# Patient Record
Sex: FEMALE | Race: WHITE | Employment: PART TIME | URBAN - NONMETROPOLITAN AREA
[De-identification: names, ages, dates, MRNs, and addresses within clinical notes are randomized per-mention and may not be internally consistent; named-entity substitution may affect disease eponyms.]

---

## 2017-03-29 ENCOUNTER — OFFICE VISIT (OUTPATIENT)
Dept: CARDIOLOGY | Age: 57
End: 2017-03-29

## 2017-03-29 VITALS
HEIGHT: 60 IN | DIASTOLIC BLOOD PRESSURE: 68 MMHG | BODY MASS INDEX: 20.59 KG/M2 | SYSTOLIC BLOOD PRESSURE: 162 MMHG | WEIGHT: 104.9 LBS | HEART RATE: 78 BPM

## 2017-03-29 DIAGNOSIS — I10 ESSENTIAL HYPERTENSION: Primary | ICD-10-CM

## 2017-03-29 DIAGNOSIS — R52 PAIN: ICD-10-CM

## 2017-03-29 DIAGNOSIS — E78.01 FAMILIAL HYPERCHOLESTEROLEMIA: ICD-10-CM

## 2017-03-29 DIAGNOSIS — I25.10 CORONARY ARTERY DISEASE INVOLVING NATIVE CORONARY ARTERY OF NATIVE HEART WITHOUT ANGINA PECTORIS: ICD-10-CM

## 2017-03-29 PROCEDURE — 3014F SCREEN MAMMO DOC REV: CPT | Performed by: NUCLEAR MEDICINE

## 2017-03-29 PROCEDURE — G8420 CALC BMI NORM PARAMETERS: HCPCS | Performed by: NUCLEAR MEDICINE

## 2017-03-29 PROCEDURE — 3017F COLORECTAL CA SCREEN DOC REV: CPT | Performed by: NUCLEAR MEDICINE

## 2017-03-29 PROCEDURE — G8484 FLU IMMUNIZE NO ADMIN: HCPCS | Performed by: NUCLEAR MEDICINE

## 2017-03-29 PROCEDURE — G8427 DOCREV CUR MEDS BY ELIG CLIN: HCPCS | Performed by: NUCLEAR MEDICINE

## 2017-03-29 PROCEDURE — 99214 OFFICE O/P EST MOD 30 MIN: CPT | Performed by: NUCLEAR MEDICINE

## 2017-03-29 PROCEDURE — 4004F PT TOBACCO SCREEN RCVD TLK: CPT | Performed by: NUCLEAR MEDICINE

## 2017-03-29 PROCEDURE — G8599 NO ASA/ANTIPLAT THER USE RNG: HCPCS | Performed by: NUCLEAR MEDICINE

## 2017-03-29 RX ORDER — AMLODIPINE BESYLATE 5 MG/1
5 TABLET ORAL DAILY
Qty: 30 TABLET | Refills: 6 | Status: SHIPPED | OUTPATIENT
Start: 2017-03-29 | End: 2017-10-27 | Stop reason: ALTCHOICE

## 2017-03-29 RX ORDER — PANTOPRAZOLE SODIUM 40 MG/1
40 TABLET, DELAYED RELEASE ORAL DAILY
Qty: 30 TABLET | Refills: 6 | Status: SHIPPED | OUTPATIENT
Start: 2017-03-29 | End: 2017-10-27 | Stop reason: ALTCHOICE

## 2017-10-27 ENCOUNTER — OFFICE VISIT (OUTPATIENT)
Dept: CARDIOLOGY CLINIC | Age: 57
End: 2017-10-27
Payer: COMMERCIAL

## 2017-10-27 VITALS
DIASTOLIC BLOOD PRESSURE: 68 MMHG | HEIGHT: 59 IN | WEIGHT: 101.9 LBS | SYSTOLIC BLOOD PRESSURE: 102 MMHG | BODY MASS INDEX: 20.54 KG/M2 | HEART RATE: 62 BPM

## 2017-10-27 DIAGNOSIS — I25.10 CORONARY ARTERY DISEASE INVOLVING NATIVE CORONARY ARTERY OF NATIVE HEART WITHOUT ANGINA PECTORIS: Primary | ICD-10-CM

## 2017-10-27 DIAGNOSIS — R07.9 CHEST PAIN, UNSPECIFIED TYPE: ICD-10-CM

## 2017-10-27 PROCEDURE — G8599 NO ASA/ANTIPLAT THER USE RNG: HCPCS | Performed by: NUCLEAR MEDICINE

## 2017-10-27 PROCEDURE — 3017F COLORECTAL CA SCREEN DOC REV: CPT | Performed by: NUCLEAR MEDICINE

## 2017-10-27 PROCEDURE — 99213 OFFICE O/P EST LOW 20 MIN: CPT | Performed by: NUCLEAR MEDICINE

## 2017-10-27 PROCEDURE — G8427 DOCREV CUR MEDS BY ELIG CLIN: HCPCS | Performed by: NUCLEAR MEDICINE

## 2017-10-27 PROCEDURE — G8484 FLU IMMUNIZE NO ADMIN: HCPCS | Performed by: NUCLEAR MEDICINE

## 2017-10-27 PROCEDURE — 1036F TOBACCO NON-USER: CPT | Performed by: NUCLEAR MEDICINE

## 2017-10-27 PROCEDURE — G8420 CALC BMI NORM PARAMETERS: HCPCS | Performed by: NUCLEAR MEDICINE

## 2017-10-27 PROCEDURE — 3014F SCREEN MAMMO DOC REV: CPT | Performed by: NUCLEAR MEDICINE

## 2017-10-27 RX ORDER — METOPROLOL SUCCINATE 25 MG/1
TABLET, EXTENDED RELEASE ORAL
COMMUNITY
End: 2017-10-27 | Stop reason: CLARIF

## 2017-10-27 RX ORDER — NITROGLYCERIN 0.4 MG/1
0.4 TABLET SUBLINGUAL EVERY 5 MIN PRN
Qty: 25 TABLET | Refills: 0 | Status: SHIPPED | OUTPATIENT
Start: 2017-10-27

## 2017-10-27 NOTE — PROGRESS NOTES
(LOPRESSOR) 25 MG tablet Take 12.5 mg by mouth daily      aspirin EC 81 MG EC tablet Take 1 tablet by mouth daily. 30 tablet 3    ALPRAZolam (XANAX) 0.25 MG tablet Take 0.25 mg by mouth every 6 hours as needed for Anxiety. Patient normally takes 1 tab at night and 1 in the morning if needed      ranitidine (ZANTAC) 150 MG tablet   Take 150 mg by mouth daily       nitroGLYCERIN (NITROSTAT) 0.4 MG SL tablet Place 0.4 mg under the tongue every 5 minutes as needed. No current facility-administered medications for this visit. Allergies   Allergen Reactions    Tylenol With Codeine #3 [Acetaminophen-Codeine] Shortness Of Breath    Flagyl [Metronidazole] Other (See Comments)     Rectal bleeding    Pcn [Penicillins] Hives    Prednisone      Sores inside mouth, stomach burning    Sulfa Antibiotics Hives    Cefdinir Nausea And Vomiting     Health Maintenance   Topic Date Due    Hepatitis C screen  1960    HIV screen  04/06/1974    DTaP/Tdap/Td vaccine (1 - Tdap) 04/06/1978    Pneumococcal med risk (1 of 1 - PPSV23) 04/06/1978    Cervical cancer screen  04/06/1980    Breast cancer screen  04/06/2009    Colon cancer screen colonoscopy  04/06/2009    Flu vaccine (1) 09/01/2017    Lipid screen  03/12/2020       Subjective:  Review of Systems  General:   No fever, no chills, some fatigue or weight loss  Pulmonary:    some dyspnea, no wheezing  Cardiac:    Denies recent chest pain,   GI:     No nausea or vomiting, no abdominal pain  Neuro:     No dizziness or light headedness,   Musculoskeletal:  No recent active issues  Extremities:   No edema, good peripheral pulses      Objective:  Physical Exam  /68   Pulse 62   Ht 4' 11\" (1.499 m)   Wt 101 lb 14.4 oz (46.2 kg)   BMI 20.58 kg/m²   General:   Well developed, well nourished  Lungs:    Clear to auscultation  Heart:    Normal S1 S2, Slight murmur.  no rubs, no gallops  Abdomen:   Soft, non tender, no organomegalies, positive bowel

## 2018-03-29 ENCOUNTER — HOSPITAL ENCOUNTER (EMERGENCY)
Age: 58
Discharge: HOME | End: 2018-03-29
Attending: FAMILY MEDICINE
Payer: COMMERCIAL

## 2018-03-29 ENCOUNTER — APPOINTMENT (OUTPATIENT)
Dept: GENERAL RADIOLOGY | Age: 58
End: 2018-03-29
Payer: COMMERCIAL

## 2018-03-29 VITALS
SYSTOLIC BLOOD PRESSURE: 98 MMHG | OXYGEN SATURATION: 100 % | TEMPERATURE: 98.2 F | HEIGHT: 59 IN | HEART RATE: 62 BPM | RESPIRATION RATE: 20 BRPM | DIASTOLIC BLOOD PRESSURE: 62 MMHG

## 2018-03-29 DIAGNOSIS — R07.9 CHEST PAIN, UNSPECIFIED TYPE: Primary | ICD-10-CM

## 2018-03-29 LAB
ALBUMIN SERPL-MCNC: 4.1 G/DL (ref 3.5–5.1)
ALP BLD-CCNC: 81 U/L (ref 38–126)
ALT SERPL-CCNC: 19 U/L (ref 11–66)
ANION GAP SERPL CALCULATED.3IONS-SCNC: 11 MEQ/L (ref 8–16)
APTT: 31.6 SECONDS (ref 22–38)
AST SERPL-CCNC: 19 U/L (ref 5–40)
BASOPHILS # BLD: 0.8 %
BASOPHILS ABSOLUTE: 0.1 THOU/MM3 (ref 0–0.1)
BILIRUB SERPL-MCNC: 0.3 MG/DL (ref 0.3–1.2)
BILIRUBIN DIRECT: < 0.2 MG/DL (ref 0–0.3)
BUN BLDV-MCNC: 15 MG/DL (ref 7–22)
CALCIUM SERPL-MCNC: 9.4 MG/DL (ref 8.5–10.5)
CHLORIDE BLD-SCNC: 104 MEQ/L (ref 98–111)
CO2: 26 MEQ/L (ref 23–33)
CREAT SERPL-MCNC: 0.7 MG/DL (ref 0.4–1.2)
D-DIMER QUANTITATIVE: 260 NG/ML FEU (ref 0–500)
EKG ATRIAL RATE: 69 BPM
EKG P AXIS: 74 DEGREES
EKG P-R INTERVAL: 110 MS
EKG Q-T INTERVAL: 398 MS
EKG QRS DURATION: 80 MS
EKG QTC CALCULATION (BAZETT): 426 MS
EKG R AXIS: 72 DEGREES
EKG T AXIS: 54 DEGREES
EKG VENTRICULAR RATE: 69 BPM
EOSINOPHIL # BLD: 2.5 %
EOSINOPHILS ABSOLUTE: 0.2 THOU/MM3 (ref 0–0.4)
GFR SERPL CREATININE-BSD FRML MDRD: 86 ML/MIN/1.73M2
GLUCOSE BLD-MCNC: 100 MG/DL (ref 70–108)
HCT VFR BLD CALC: 43 % (ref 37–47)
HEMOGLOBIN: 14.7 GM/DL (ref 12–16)
INR BLD: 0.87 (ref 0.85–1.13)
LIPASE: 28.7 U/L (ref 5.6–51.3)
LYMPHOCYTES # BLD: 23.5 %
LYMPHOCYTES ABSOLUTE: 1.7 THOU/MM3 (ref 1–4.8)
MCH RBC QN AUTO: 30.1 PG (ref 27–31)
MCHC RBC AUTO-ENTMCNC: 34.1 GM/DL (ref 33–37)
MCV RBC AUTO: 88.3 FL (ref 81–99)
MONOCYTES # BLD: 5.8 %
MONOCYTES ABSOLUTE: 0.4 THOU/MM3 (ref 0.4–1.3)
NUCLEATED RED BLOOD CELLS: 0 /100 WBC
OSMOLALITY CALCULATION: 282.2 MOSMOL/KG (ref 275–300)
PDW BLD-RTO: 13.9 % (ref 11.5–14.5)
PLATELET # BLD: 202 THOU/MM3 (ref 130–400)
PMV BLD AUTO: 8.6 FL (ref 7.4–10.4)
POTASSIUM SERPL-SCNC: 3.9 MEQ/L (ref 3.5–5.2)
RBC # BLD: 4.87 MILL/MM3 (ref 4.2–5.4)
SEG NEUTROPHILS: 67.4 %
SEGMENTED NEUTROPHILS ABSOLUTE COUNT: 4.8 THOU/MM3 (ref 1.8–7.7)
SODIUM BLD-SCNC: 141 MEQ/L (ref 135–145)
TOTAL PROTEIN: 6.7 G/DL (ref 6.1–8)
TROPONIN T: < 0.01 NG/ML
WBC # BLD: 7.1 THOU/MM3 (ref 4.8–10.8)

## 2018-03-29 PROCEDURE — 82248 BILIRUBIN DIRECT: CPT

## 2018-03-29 PROCEDURE — 2580000003 HC RX 258: Performed by: FAMILY MEDICINE

## 2018-03-29 PROCEDURE — 99285 EMERGENCY DEPT VISIT HI MDM: CPT

## 2018-03-29 PROCEDURE — 83690 ASSAY OF LIPASE: CPT

## 2018-03-29 PROCEDURE — 80053 COMPREHEN METABOLIC PANEL: CPT

## 2018-03-29 PROCEDURE — 84484 ASSAY OF TROPONIN QUANT: CPT

## 2018-03-29 PROCEDURE — 85730 THROMBOPLASTIN TIME PARTIAL: CPT

## 2018-03-29 PROCEDURE — 85610 PROTHROMBIN TIME: CPT

## 2018-03-29 PROCEDURE — 6370000000 HC RX 637 (ALT 250 FOR IP): Performed by: FAMILY MEDICINE

## 2018-03-29 PROCEDURE — 85025 COMPLETE CBC W/AUTO DIFF WBC: CPT

## 2018-03-29 PROCEDURE — 93005 ELECTROCARDIOGRAM TRACING: CPT | Performed by: FAMILY MEDICINE

## 2018-03-29 PROCEDURE — 71045 X-RAY EXAM CHEST 1 VIEW: CPT

## 2018-03-29 PROCEDURE — 36415 COLL VENOUS BLD VENIPUNCTURE: CPT

## 2018-03-29 PROCEDURE — 85379 FIBRIN DEGRADATION QUANT: CPT

## 2018-03-29 RX ORDER — IBUPROFEN 600 MG/1
600 TABLET ORAL
Qty: 30 TABLET | Refills: 0 | Status: SHIPPED | OUTPATIENT
Start: 2018-03-29

## 2018-03-29 RX ORDER — ASPIRIN 81 MG/1
324 TABLET, CHEWABLE ORAL ONCE
Status: COMPLETED | OUTPATIENT
Start: 2018-03-29 | End: 2018-03-29

## 2018-03-29 RX ORDER — SODIUM CHLORIDE 9 MG/ML
INJECTION, SOLUTION INTRAVENOUS CONTINUOUS
Status: DISCONTINUED | OUTPATIENT
Start: 2018-03-29 | End: 2018-03-29 | Stop reason: HOSPADM

## 2018-03-29 RX ADMIN — SODIUM CHLORIDE: 9 INJECTION, SOLUTION INTRAVENOUS at 08:30

## 2018-03-29 RX ADMIN — ASPIRIN 81 MG 324 MG: 81 TABLET ORAL at 08:30

## 2018-03-29 ASSESSMENT — ENCOUNTER SYMPTOMS
COUGH: 1
ABDOMINAL PAIN: 0
SHORTNESS OF BREATH: 1
WHEEZING: 1
VOMITING: 0
NAUSEA: 1
EYE DISCHARGE: 0

## 2018-03-29 ASSESSMENT — PAIN DESCRIPTION - LOCATION
LOCATION: CHEST
LOCATION: CHEST

## 2018-03-29 ASSESSMENT — PAIN SCALES - GENERAL
PAINLEVEL_OUTOF10: 6
PAINLEVEL_OUTOF10: 7

## 2018-03-29 ASSESSMENT — PAIN DESCRIPTION - FREQUENCY
FREQUENCY: INTERMITTENT
FREQUENCY: INTERMITTENT

## 2018-03-29 ASSESSMENT — PAIN DESCRIPTION - PAIN TYPE: TYPE: ACUTE PAIN

## 2018-03-29 NOTE — ED TRIAGE NOTES
Patient presents with chest pain today that is intermittent. Patient denies shortness of breath or diaphoresis. She had a nitro around 6:00AM today that did relieve her pain. She states she has not taken her blood pressure medication in the last two months. Call light in reach. Dr. Ashu Sharp at bedside.

## 2018-03-29 NOTE — ED PROVIDER NOTES
Pressure in her mother; Kidney Disease in her brother, brother, and mother. SOCIAL HISTORY      reports that she quit smoking about 17 months ago. Her smoking use included Cigarettes. She has never used smokeless tobacco. She reports that she does not drink alcohol or use drugs. PHYSICAL EXAM     INITIAL VITALS:  height is 4' 11\" (1.499 m). Her oral temperature is 98.2 °F (36.8 °C). Her blood pressure is 98/62 and her pulse is 62. Her respiration is 20 and oxygen saturation is 100%. Physical Exam   Constitutional: She is oriented to person, place, and time. She appears well-developed and well-nourished. GCS 15   HENT:   Head: Normocephalic and atraumatic. Right ear canal clear TM normal    Left ear canal clear with perforation of the TM    Nose has mild rhinorrhea    Throat normal     Eyes: Conjunctivae are normal. Pupils are equal, round, and reactive to light. No scleral icterus. Neck: Normal range of motion. Neck supple. No JVD present. No thyromegaly present. Cardiovascular: Normal rate and regular rhythm. Pulmonary/Chest: She has wheezes. She exhibits no tenderness. Scattered wheeze right   Abdominal: Soft. Bowel sounds are normal. There is no tenderness. There is no rebound and no guarding. Musculoskeletal: She exhibits no edema or tenderness. Lymphadenopathy:     She has no cervical adenopathy. Neurological: She is alert and oriented to person, place, and time. She exhibits normal muscle tone. Skin: Skin is warm and dry. No rash noted. Psychiatric: She has a normal mood and affect. Her behavior is normal.   Nursing note and vitals reviewed.       DIFFERENTIAL DIAGNOSIS:   Chest pains, intermittent, nonexertional, sharp, unlikely to be cardiac    Evaluate for cardiac ischemia, consider PE, pneumonia, bronchitis, chest wall pain        DIAGNOSTIC RESULTS     EKG: All EKG's are interpreted by the Emergency Department Physician who either signs or Co-signs this chart in the absence Riley Beauchamp MD.    Provider:  I personally performed the services described in the documentation, reviewed and edited the documentation which was dictated to the scribe in my presence, and it accurately records my words and actions.     Julia Lewis MD 3/29/18 9:42 AM                        Julia Lewis MD  03/29/18 0160

## 2018-05-24 ENCOUNTER — HOSPITAL ENCOUNTER (OUTPATIENT)
Dept: INTERVENTIONAL RADIOLOGY/VASCULAR | Age: 58
Discharge: HOME | End: 2018-05-24
Payer: COMMERCIAL

## 2018-05-24 DIAGNOSIS — R52 PAIN: ICD-10-CM

## 2018-05-24 PROCEDURE — 93970 EXTREMITY STUDY: CPT

## 2018-05-24 PROCEDURE — 93925 LOWER EXTREMITY STUDY: CPT

## 2018-12-01 ENCOUNTER — HOSPITAL ENCOUNTER (EMERGENCY)
Age: 58
Discharge: HOME | End: 2018-12-01

## 2018-12-01 VITALS
DIASTOLIC BLOOD PRESSURE: 67 MMHG | HEART RATE: 82 BPM | OXYGEN SATURATION: 97 % | BODY MASS INDEX: 20.42 KG/M2 | WEIGHT: 104 LBS | HEIGHT: 60 IN | RESPIRATION RATE: 18 BRPM | SYSTOLIC BLOOD PRESSURE: 133 MMHG | TEMPERATURE: 98.4 F

## 2018-12-01 DIAGNOSIS — E86.0 DEHYDRATION: Primary | ICD-10-CM

## 2018-12-01 PROCEDURE — 99213 OFFICE O/P EST LOW 20 MIN: CPT

## 2018-12-01 PROCEDURE — 94640 AIRWAY INHALATION TREATMENT: CPT

## 2018-12-01 PROCEDURE — 6360000002 HC RX W HCPCS: Performed by: NURSE PRACTITIONER

## 2018-12-01 PROCEDURE — 99213 OFFICE O/P EST LOW 20 MIN: CPT | Performed by: NURSE PRACTITIONER

## 2018-12-01 PROCEDURE — 96360 HYDRATION IV INFUSION INIT: CPT

## 2018-12-01 PROCEDURE — 2709999900 HC NON-CHARGEABLE SUPPLY

## 2018-12-01 PROCEDURE — 2580000003 HC RX 258: Performed by: NURSE PRACTITIONER

## 2018-12-01 PROCEDURE — 96372 THER/PROPH/DIAG INJ SC/IM: CPT

## 2018-12-01 PROCEDURE — 6370000000 HC RX 637 (ALT 250 FOR IP): Performed by: NURSE PRACTITIONER

## 2018-12-01 RX ORDER — ESOMEPRAZOLE MAGNESIUM 20 MG/1
20 FOR SUSPENSION ORAL DAILY
Status: ON HOLD | COMMUNITY
End: 2019-04-15 | Stop reason: ALTCHOICE

## 2018-12-01 RX ORDER — 0.9 % SODIUM CHLORIDE 0.9 %
500 INTRAVENOUS SOLUTION INTRAVENOUS ONCE
Status: COMPLETED | OUTPATIENT
Start: 2018-12-01 | End: 2018-12-01

## 2018-12-01 RX ORDER — LEVOFLOXACIN 500 MG/1
500 TABLET, FILM COATED ORAL DAILY
Status: ON HOLD | COMMUNITY
End: 2019-04-15 | Stop reason: ALTCHOICE

## 2018-12-01 RX ORDER — IPRATROPIUM BROMIDE AND ALBUTEROL SULFATE 2.5; .5 MG/3ML; MG/3ML
1 SOLUTION RESPIRATORY (INHALATION) 2 TIMES DAILY
Qty: 42 ML | Refills: 0 | Status: SHIPPED | OUTPATIENT
Start: 2018-12-01 | End: 2018-12-08

## 2018-12-01 RX ORDER — IPRATROPIUM BROMIDE AND ALBUTEROL SULFATE 2.5; .5 MG/3ML; MG/3ML
1 SOLUTION RESPIRATORY (INHALATION) ONCE
Status: COMPLETED | OUTPATIENT
Start: 2018-12-01 | End: 2018-12-01

## 2018-12-01 RX ORDER — ONDANSETRON 2 MG/ML
4 INJECTION INTRAMUSCULAR; INTRAVENOUS ONCE
Status: COMPLETED | OUTPATIENT
Start: 2018-12-01 | End: 2018-12-01

## 2018-12-01 RX ORDER — PREDNISONE 10 MG/1
10 TABLET ORAL SEE ADMIN INSTRUCTIONS
Qty: 21 TABLET | Refills: 0 | Status: SHIPPED | OUTPATIENT
Start: 2018-12-01 | End: 2018-12-07

## 2018-12-01 RX ORDER — ONDANSETRON 4 MG/1
4 TABLET, FILM COATED ORAL EVERY 8 HOURS PRN
Qty: 9 TABLET | Refills: 0 | Status: SHIPPED | OUTPATIENT
Start: 2018-12-01 | End: 2018-12-04

## 2018-12-01 RX ADMIN — IPRATROPIUM BROMIDE AND ALBUTEROL SULFATE 1 AMPULE: .5; 3 SOLUTION RESPIRATORY (INHALATION) at 11:54

## 2018-12-01 RX ADMIN — SODIUM CHLORIDE 500 ML: 9 INJECTION, SOLUTION INTRAVENOUS at 12:01

## 2018-12-01 RX ADMIN — ONDANSETRON 4 MG: 2 INJECTION INTRAMUSCULAR; INTRAVENOUS at 12:01

## 2018-12-01 ASSESSMENT — ENCOUNTER SYMPTOMS
VOICE CHANGE: 1
COUGH: 1
SINUS PRESSURE: 1
ABDOMINAL PAIN: 1
NAUSEA: 1
DIARRHEA: 0
SORE THROAT: 0

## 2018-12-01 NOTE — ED PROVIDER NOTES
[id number]  Urgent Care Encounter      CHIEF COMPLAINT       Chief Complaint   Patient presents with    Nausea    Cough    Emesis     x6 in past 24 hours       Nurses Notes reviewed and I agree except as noted in the HPI. HISTORY OFPRESENT ILLNESS   Vernon Mckeon is a 60 y.o. The history is provided by the patient. No  was used. Nausea & Vomiting   Severity:  Moderate  Duration:  1 week  Timing:  Constant  Number of daily episodes:  6  Quality:  Stomach contents, undigested food and bilious material  Able to tolerate:  Liquids  Progression:  Worsening  Chronicity:  Recurrent  Context: post-tussive    Relieved by:  Nothing  Worsened by:  Nothing  Associated symptoms: abdominal pain, chills, cough, fever, headaches, myalgias and URI    Associated symptoms: no arthralgias, no diarrhea and no sore throat    Associated symptoms comment:  Z pack from PCP no improvement phone visit  Diagnosed with pneumonia. Levaquin, Nebulizer tx  Cough:     Cough characteristics:  Non-productive, dry and productive    Severity:  Moderate    Onset quality:  Sudden    Timing:  Constant    Chronicity:  Recurrent  Risk factors: sick contacts    Risk factors: no alcohol use, no diabetes, not pregnant, no prior abdominal surgery, no suspect food intake and no travel to endemic areas        REVIEW OF SYSTEMS     Review of Systems   Constitutional: Positive for chills and fever. HENT: Positive for congestion, postnasal drip, sinus pressure and voice change. Negative for sore throat. Respiratory: Positive for cough. Cardiovascular: Positive for chest pain. Gastrointestinal: Positive for abdominal pain and nausea. Negative for diarrhea. Musculoskeletal: Positive for myalgias. Negative for arthralgias. Neurological: Positive for dizziness, light-headedness and headaches.        PAST MEDICAL HISTORY         Diagnosis Date    Allergic rhinitis     seasonal    Angina, class I ([facility] [address]     ipratropium-albuterol (DUONEB) nebulizer solution 1 ampule (1 ampule Inhalation Given 12/1/18 6884)     PROCEDURES:  None  FINAL IMPRESSION      1. Dehydration        DISPOSITION/PLAN      The patient was advised to drink plenty of fluids. They may take Tylenol for fever or body aches. Take prescribed medication as directed. They may also take OTC antihistamines/ decongestant as needed. Pt is advised to go to ER if symptoms worsen, new symptoms develop, high fever >102, vomiting, breathing difficulty, lethargy, chest pain or shortness of breath Dial 911. The patient or patient's representative is agreeable to the treatment plan they're advised to follow-up with her primary care provider in one week for reevaluation. PATIENT REFERRED TO:  Raghu Scott MD  [address]  [address] [facility] [address]    Schedule an appointment as soon as possible for a visit in 1 week  For re-check    [facility]  [phone]  Go to   If symptoms worsen    DISCHARGE MEDICATIONS:  New Prescriptions    IPRATROPIUM-ALBUTEROL (DUONEB) 0.5-2.5 (3) MG/3ML SOLN NEBULIZER SOLUTION    Take 3 mLs by nebulization 2 times daily for 7 days    ONDANSETRON (ZOFRAN) 4 MG TABLET    Take 1 tablet by mouth every 8 hours as needed for Nausea    PREDNISONE (DELTASONE) 10 MG TABLET    Take 1 tablet by mouth See Admin Instructions for 6 days 60 mgs p.o x 1 day. 50 mgs p.o x 1 day. 40 mgs p.o x 1 day. 30 mgs p.o x 1 day. 20 mgs p.o x 1 day. 10 mgs p.o x 1 day. If burning or tingling sensation in mouth begins stop medication and take Benadryl. Current Discharge Medication List        Discussed allergic reaction to prednisone patient states unsure if it was the actual cause of the mouth sores, she was taking 2 medications at the time. She chose to try the medicine and expresses understanding if symptoms occur to stop medication take Benadryl and return as needed.     Delma Olivares

## 2018-12-01 NOTE — ED TRIAGE NOTES
ambulatory back to exam room. Patient complains of nausea, vomiting x6 in past 24 hours and cough. Symptoms began 1 day ago. The cough is nonproductive a this time. Pt reports that she was diagnosed with pneumonia on Monday at her pcp's office and is currently taking levaquin. Respirations easy and unlabored. Condition stable. Waiting in room to be seen by medical provider.

## 2019-03-02 ENCOUNTER — NURSE TRIAGE (OUTPATIENT)
Dept: ADMINISTRATIVE | Age: 59
End: 2019-03-02

## 2019-04-15 ENCOUNTER — APPOINTMENT (OUTPATIENT)
Dept: CT IMAGING | Age: 59
DRG: 149 | End: 2019-04-15

## 2019-04-15 ENCOUNTER — HOSPITAL ENCOUNTER (INPATIENT)
Age: 59
LOS: 1 days | Discharge: HOME | DRG: 149 | End: 2019-04-16
Attending: EMERGENCY MEDICINE | Admitting: INTERNAL MEDICINE

## 2019-04-15 DIAGNOSIS — R55 NEAR SYNCOPE: Primary | ICD-10-CM

## 2019-04-15 DIAGNOSIS — E87.20 METABOLIC ACIDOSIS: ICD-10-CM

## 2019-04-15 DIAGNOSIS — Z86.79 HX OF CORONARY ARTERY DISEASE: ICD-10-CM

## 2019-04-15 DIAGNOSIS — H81.10 BENIGN PAROXYSMAL POSITIONAL VERTIGO, UNSPECIFIED LATERALITY: ICD-10-CM

## 2019-04-15 PROBLEM — R42 DIZZINESS: Status: ACTIVE | Noted: 2019-04-15

## 2019-04-15 LAB
ALBUMIN SERPL-MCNC: 3.6 G/DL (ref 3.5–5.1)
ALP BLD-CCNC: 92 U/L (ref 38–126)
ALT SERPL-CCNC: 11 U/L (ref 11–66)
AMPHETAMINE+METHAMPHETAMINE URINE SCREEN: NEGATIVE
ANION GAP SERPL CALCULATED.3IONS-SCNC: 10 MEQ/L (ref 8–16)
AST SERPL-CCNC: 15 U/L (ref 5–40)
BACTERIA: ABNORMAL
BARBITURATE QUANTITATIVE URINE: NEGATIVE
BASOPHILS # BLD: 0.6 %
BASOPHILS ABSOLUTE: 0 THOU/MM3 (ref 0–0.1)
BENZODIAZEPINE QUANTITATIVE URINE: NEGATIVE
BILIRUB SERPL-MCNC: 0.2 MG/DL (ref 0.3–1.2)
BILIRUBIN DIRECT: < 0.2 MG/DL (ref 0–0.3)
BILIRUBIN URINE: NEGATIVE
BLOOD, URINE: NEGATIVE
BUN BLDV-MCNC: 15 MG/DL (ref 7–22)
CALCIUM SERPL-MCNC: 8 MG/DL (ref 8.5–10.5)
CANNABINOID QUANTITATIVE URINE: NEGATIVE
CASTS: ABNORMAL /LPF
CASTS: ABNORMAL /LPF
CHARACTER, URINE: ABNORMAL
CHLORIDE BLD-SCNC: 111 MEQ/L (ref 98–111)
CO2: 21 MEQ/L (ref 23–33)
COCAINE METABOLITE QUANTITATIVE URINE: NEGATIVE
COLOR: YELLOW
CREAT SERPL-MCNC: 0.7 MG/DL (ref 0.4–1.2)
CRYSTALS: ABNORMAL
EKG ATRIAL RATE: 71 BPM
EKG P AXIS: 74 DEGREES
EKG P-R INTERVAL: 108 MS
EKG Q-T INTERVAL: 410 MS
EKG QRS DURATION: 78 MS
EKG QTC CALCULATION (BAZETT): 445 MS
EKG R AXIS: 72 DEGREES
EKG T AXIS: 62 DEGREES
EKG VENTRICULAR RATE: 71 BPM
EOSINOPHIL # BLD: 2.6 %
EOSINOPHILS ABSOLUTE: 0.2 THOU/MM3 (ref 0–0.4)
EPITHELIAL CELLS, UA: ABNORMAL /HPF
ERYTHROCYTE [DISTWIDTH] IN BLOOD BY AUTOMATED COUNT: 13.9 % (ref 11.5–14.5)
ERYTHROCYTE [DISTWIDTH] IN BLOOD BY AUTOMATED COUNT: 44.6 FL (ref 35–45)
ETHYL ALCOHOL, SERUM: < 0.01 %
GFR SERPL CREATININE-BSD FRML MDRD: 85 ML/MIN/1.73M2
GLUCOSE BLD-MCNC: 104 MG/DL (ref 70–108)
GLUCOSE, URINE: NEGATIVE MG/DL
HCT VFR BLD CALC: 36.1 % (ref 37–47)
HEMOGLOBIN: 12.1 GM/DL (ref 12–16)
IMMATURE GRANS (ABS): 0.02 THOU/MM3 (ref 0–0.07)
IMMATURE GRANULOCYTES: 0.3 %
KETONES, URINE: NEGATIVE
LEUKOCYTE ESTERASE, URINE: ABNORMAL
LIPASE: 32.6 U/L (ref 5.6–51.3)
LYMPHOCYTES # BLD: 39.7 %
LYMPHOCYTES ABSOLUTE: 2.5 THOU/MM3 (ref 1–4.8)
MAGNESIUM: 2 MG/DL (ref 1.6–2.4)
MCH RBC QN AUTO: 29.6 PG (ref 26–33)
MCHC RBC AUTO-ENTMCNC: 33.5 GM/DL (ref 32.2–35.5)
MCV RBC AUTO: 88.3 FL (ref 81–99)
MISCELLANEOUS LAB TEST RESULT: ABNORMAL
MONOCYTES # BLD: 8.4 %
MONOCYTES ABSOLUTE: 0.5 THOU/MM3 (ref 0.4–1.3)
NITRITE, URINE: NEGATIVE
NUCLEATED RED BLOOD CELLS: 0 /100 WBC
OPIATES, URINE: NEGATIVE
OSMOLALITY CALCULATION: 284.3 MOSMOL/KG (ref 275–300)
OXYCODONE: NEGATIVE
PH UA: 6 (ref 5–9)
PHENCYCLIDINE QUANTITATIVE URINE: NEGATIVE
PLATELET # BLD: 195 THOU/MM3 (ref 130–400)
PMV BLD AUTO: 10.4 FL (ref 9.4–12.4)
POTASSIUM SERPL-SCNC: 3.5 MEQ/L (ref 3.5–5.2)
PRO-BNP: 154.2 PG/ML (ref 0–900)
PROTEIN UA: NEGATIVE MG/DL
RBC # BLD: 4.09 MILL/MM3 (ref 4.2–5.4)
RBC URINE: ABNORMAL /HPF
RENAL EPITHELIAL, UA: ABNORMAL
SEG NEUTROPHILS: 48.4 %
SEGMENTED NEUTROPHILS ABSOLUTE COUNT: 3 THOU/MM3 (ref 1.8–7.7)
SODIUM BLD-SCNC: 142 MEQ/L (ref 135–145)
SPECIFIC GRAVITY UA: 1.01 (ref 1–1.03)
TOTAL PROTEIN: 5.7 G/DL (ref 6.1–8)
TROPONIN T: < 0.01 NG/ML
TSH SERPL DL<=0.05 MIU/L-ACNC: 3.22 UIU/ML (ref 0.4–4.2)
UROBILINOGEN, URINE: 0.2 EU/DL (ref 0–1)
WBC # BLD: 6.2 THOU/MM3 (ref 4.8–10.8)
WBC UA: ABNORMAL /HPF
YEAST: ABNORMAL

## 2019-04-15 PROCEDURE — 6360000002 HC RX W HCPCS: Performed by: EMERGENCY MEDICINE

## 2019-04-15 PROCEDURE — 2709999900 HC NON-CHARGEABLE SUPPLY

## 2019-04-15 PROCEDURE — 6370000000 HC RX 637 (ALT 250 FOR IP): Performed by: PSYCHIATRY & NEUROLOGY

## 2019-04-15 PROCEDURE — 2580000003 HC RX 258: Performed by: INTERNAL MEDICINE

## 2019-04-15 PROCEDURE — 70450 CT HEAD/BRAIN W/O DYE: CPT

## 2019-04-15 PROCEDURE — 80076 HEPATIC FUNCTION PANEL: CPT

## 2019-04-15 PROCEDURE — 6370000000 HC RX 637 (ALT 250 FOR IP): Performed by: EMERGENCY MEDICINE

## 2019-04-15 PROCEDURE — 2580000003 HC RX 258: Performed by: EMERGENCY MEDICINE

## 2019-04-15 PROCEDURE — G0480 DRUG TEST DEF 1-7 CLASSES: HCPCS

## 2019-04-15 PROCEDURE — 93005 ELECTROCARDIOGRAM TRACING: CPT | Performed by: EMERGENCY MEDICINE

## 2019-04-15 PROCEDURE — 84484 ASSAY OF TROPONIN QUANT: CPT

## 2019-04-15 PROCEDURE — 6370000000 HC RX 637 (ALT 250 FOR IP): Performed by: INTERNAL MEDICINE

## 2019-04-15 PROCEDURE — 80307 DRUG TEST PRSMV CHEM ANLYZR: CPT

## 2019-04-15 PROCEDURE — 6370000000 HC RX 637 (ALT 250 FOR IP): Performed by: FAMILY MEDICINE

## 2019-04-15 PROCEDURE — 80048 BASIC METABOLIC PNL TOTAL CA: CPT

## 2019-04-15 PROCEDURE — 81001 URINALYSIS AUTO W/SCOPE: CPT

## 2019-04-15 PROCEDURE — 99223 1ST HOSP IP/OBS HIGH 75: CPT | Performed by: PSYCHIATRY & NEUROLOGY

## 2019-04-15 PROCEDURE — 36415 COLL VENOUS BLD VENIPUNCTURE: CPT

## 2019-04-15 PROCEDURE — 85025 COMPLETE CBC W/AUTO DIFF WBC: CPT

## 2019-04-15 PROCEDURE — 83880 ASSAY OF NATRIURETIC PEPTIDE: CPT

## 2019-04-15 PROCEDURE — 83735 ASSAY OF MAGNESIUM: CPT

## 2019-04-15 PROCEDURE — 96374 THER/PROPH/DIAG INJ IV PUSH: CPT

## 2019-04-15 PROCEDURE — 83690 ASSAY OF LIPASE: CPT

## 2019-04-15 PROCEDURE — 84443 ASSAY THYROID STIM HORMONE: CPT

## 2019-04-15 PROCEDURE — 99223 1ST HOSP IP/OBS HIGH 75: CPT | Performed by: INTERNAL MEDICINE

## 2019-04-15 PROCEDURE — 99285 EMERGENCY DEPT VISIT HI MDM: CPT

## 2019-04-15 PROCEDURE — 1200000003 HC TELEMETRY R&B

## 2019-04-15 PROCEDURE — 93010 ELECTROCARDIOGRAM REPORT: CPT | Performed by: INTERNAL MEDICINE

## 2019-04-15 RX ORDER — ONDANSETRON 2 MG/ML
4 INJECTION INTRAMUSCULAR; INTRAVENOUS EVERY 6 HOURS PRN
Status: DISCONTINUED | OUTPATIENT
Start: 2019-04-15 | End: 2019-04-16 | Stop reason: HOSPADM

## 2019-04-15 RX ORDER — MECLIZINE HCL 12.5 MG/1
12.5 TABLET ORAL 3 TIMES DAILY PRN
Status: DISCONTINUED | OUTPATIENT
Start: 2019-04-15 | End: 2019-04-16 | Stop reason: HOSPADM

## 2019-04-15 RX ORDER — PANTOPRAZOLE SODIUM 40 MG/1
40 TABLET, DELAYED RELEASE ORAL DAILY
Status: DISCONTINUED | OUTPATIENT
Start: 2019-04-15 | End: 2019-04-16 | Stop reason: HOSPADM

## 2019-04-15 RX ORDER — ASPIRIN 81 MG/1
81 TABLET ORAL DAILY
Status: DISCONTINUED | OUTPATIENT
Start: 2019-04-15 | End: 2019-04-16 | Stop reason: HOSPADM

## 2019-04-15 RX ORDER — 0.9 % SODIUM CHLORIDE 0.9 %
1000 INTRAVENOUS SOLUTION INTRAVENOUS ONCE
Status: COMPLETED | OUTPATIENT
Start: 2019-04-15 | End: 2019-04-15

## 2019-04-15 RX ORDER — MECLIZINE HYDROCHLORIDE CHEWABLE TABLETS 25 MG/1
50 TABLET, CHEWABLE ORAL ONCE
Status: COMPLETED | OUTPATIENT
Start: 2019-04-15 | End: 2019-04-15

## 2019-04-15 RX ORDER — ACETAMINOPHEN 325 MG/1
650 TABLET ORAL EVERY 4 HOURS PRN
Status: DISCONTINUED | OUTPATIENT
Start: 2019-04-15 | End: 2019-04-16 | Stop reason: HOSPADM

## 2019-04-15 RX ORDER — PRIMIDONE 50 MG/1
50 TABLET ORAL DAILY
Status: DISCONTINUED | OUTPATIENT
Start: 2019-04-15 | End: 2019-04-16 | Stop reason: HOSPADM

## 2019-04-15 RX ORDER — ALPRAZOLAM 0.25 MG/1
0.25 TABLET ORAL EVERY 6 HOURS PRN
Status: DISCONTINUED | OUTPATIENT
Start: 2019-04-15 | End: 2019-04-16 | Stop reason: HOSPADM

## 2019-04-15 RX ORDER — ONDANSETRON 2 MG/ML
4 INJECTION INTRAMUSCULAR; INTRAVENOUS ONCE
Status: COMPLETED | OUTPATIENT
Start: 2019-04-15 | End: 2019-04-15

## 2019-04-15 RX ORDER — SODIUM CHLORIDE 0.9 % (FLUSH) 0.9 %
10 SYRINGE (ML) INJECTION PRN
Status: DISCONTINUED | OUTPATIENT
Start: 2019-04-15 | End: 2019-04-16 | Stop reason: HOSPADM

## 2019-04-15 RX ORDER — SODIUM CHLORIDE 0.9 % (FLUSH) 0.9 %
10 SYRINGE (ML) INJECTION EVERY 12 HOURS SCHEDULED
Status: DISCONTINUED | OUTPATIENT
Start: 2019-04-15 | End: 2019-04-16 | Stop reason: HOSPADM

## 2019-04-15 RX ORDER — OMEPRAZOLE 10 MG/1
10 CAPSULE, DELAYED RELEASE ORAL DAILY
COMMUNITY

## 2019-04-15 RX ADMIN — PANTOPRAZOLE SODIUM 40 MG: 40 TABLET, DELAYED RELEASE ORAL at 10:09

## 2019-04-15 RX ADMIN — Medication 10 ML: at 10:11

## 2019-04-15 RX ADMIN — ACETAMINOPHEN 650 MG: 325 TABLET ORAL at 11:27

## 2019-04-15 RX ADMIN — MECLIZINE HCL 50 MG: 25 TABLET, CHEWABLE ORAL at 02:18

## 2019-04-15 RX ADMIN — ACETAMINOPHEN 650 MG: 325 TABLET ORAL at 18:02

## 2019-04-15 RX ADMIN — MECLIZINE HYDROCHLORIDE 12.5 MG: 12.5 TABLET, FILM COATED ORAL at 18:02

## 2019-04-15 RX ADMIN — ONDANSETRON 4 MG: 2 INJECTION INTRAMUSCULAR; INTRAVENOUS at 02:18

## 2019-04-15 RX ADMIN — PRIMIDONE 50 MG: 50 TABLET ORAL at 16:53

## 2019-04-15 RX ADMIN — SODIUM CHLORIDE 1000 ML: 9 INJECTION, SOLUTION INTRAVENOUS at 03:10

## 2019-04-15 RX ADMIN — Medication 10 ML: at 20:03

## 2019-04-15 ASSESSMENT — ENCOUNTER SYMPTOMS
TROUBLE SWALLOWING: 0
SHORTNESS OF BREATH: 0
CONSTIPATION: 0
BLOOD IN STOOL: 0
EYE ITCHING: 0
NAUSEA: 1
EYE PAIN: 0
EYE REDNESS: 0
BACK PAIN: 0
PHOTOPHOBIA: 0
COUGH: 0
SORE THROAT: 0
DIARRHEA: 0
CHOKING: 0
CHEST TIGHTNESS: 0
EYE DISCHARGE: 0
RHINORRHEA: 0
ABDOMINAL PAIN: 0
VOICE CHANGE: 0
SINUS PRESSURE: 0
WHEEZING: 0
VOMITING: 0
ABDOMINAL DISTENTION: 0

## 2019-04-15 ASSESSMENT — PAIN SCALES - GENERAL
PAINLEVEL_OUTOF10: 6
PAINLEVEL_OUTOF10: 4
PAINLEVEL_OUTOF10: 6
PAINLEVEL_OUTOF10: 0
PAINLEVEL_OUTOF10: 4

## 2019-04-15 ASSESSMENT — PAIN DESCRIPTION - LOCATION: LOCATION: BACK;LEG

## 2019-04-15 ASSESSMENT — PAIN DESCRIPTION - DESCRIPTORS: DESCRIPTORS: CONSTANT;TINGLING;BURNING

## 2019-04-15 ASSESSMENT — PAIN DESCRIPTION - PROGRESSION: CLINICAL_PROGRESSION: GRADUALLY IMPROVING

## 2019-04-15 ASSESSMENT — PAIN DESCRIPTION - ORIENTATION: ORIENTATION: DISTAL

## 2019-04-15 ASSESSMENT — PAIN - FUNCTIONAL ASSESSMENT: PAIN_FUNCTIONAL_ASSESSMENT: ACTIVITIES ARE NOT PREVENTED

## 2019-04-15 ASSESSMENT — PAIN DESCRIPTION - FREQUENCY: FREQUENCY: CONTINUOUS

## 2019-04-15 ASSESSMENT — PAIN DESCRIPTION - ONSET: ONSET: ON-GOING

## 2019-04-15 NOTE — PROGRESS NOTES
Dr Javon Joe messaged through [employer] at [phone]. Dr Javon Joe messaged back and states they will see.

## 2019-04-15 NOTE — ED PROVIDER NOTES
disease in the LAD LAD has 50-60% disease in the midsegment which is not significant physiologically by FR continued optimal medical management. The HPI was provided by the patient. REVIEW OF SYSTEMS      Review of Systems   Constitutional: Negative for activity change, appetite change, diaphoresis, fatigue and unexpected weight change. HENT: Negative for congestion, ear discharge, ear pain, hearing loss, rhinorrhea, sinus pressure, sore throat, trouble swallowing and voice change. Eyes: Negative for photophobia, pain, discharge, redness and itching. Respiratory: Negative for cough, choking, chest tightness, shortness of breath and wheezing. Cardiovascular: Negative for chest pain, palpitations and leg swelling. Gastrointestinal: Positive for nausea (secondary to dizziness). Negative for abdominal distention, abdominal pain, blood in stool, constipation, diarrhea and vomiting. Endocrine: Negative for polydipsia, polyphagia and polyuria. Genitourinary: Negative for decreased urine volume, difficulty urinating, dysuria, enuresis, frequency, hematuria and urgency. Musculoskeletal: Negative for arthralgias, back pain, gait problem, myalgias, neck pain and neck stiffness. Skin: Negative for pallor and rash. Allergic/Immunologic: Negative for immunocompromised state. Neurological: Positive for dizziness (worse with change of position). Negative for tremors, seizures, syncope, facial asymmetry, weakness, light-headedness, numbness and headaches. Hematological: Negative for adenopathy. Does not bruise/bleed easily. Psychiatric/Behavioral: Negative for agitation, hallucinations and suicidal ideas. The patient is not nervous/anxious.           PAST MEDICAL HISTORY    has a past medical history of Allergic rhinitis, Angina, class I ([address] Anxiety, Asthma, CAD (coronary artery disease), Cancer ([facility] [address] Cancer ([facility] [address] Chronic back pain, Hyperlipidemia, Neuropathy, Peripheral vascular disease ([facility] [address] Tobacco abuse, Unspecified cerebral artery occlusion with cerebral infarction, and Unspecified sleep apnea. SURGICAL HISTORY      has a past surgical history that includes Colon surgery (); Colonoscopy;  section; Myringotomy Tympanostomy Tube Placement (yrs ago); Endometrial ablation (); polypectomy (); Cardiac catheterization (15, , ); and Spine surgery. CURRENT MEDICATIONS       Previous Medications    ALPRAZOLAM (XANAX) 0.25 MG TABLET    Take 0.25 mg by mouth every 6 hours as needed for Anxiety. Patient normally takes 1 tab at night and 1 in the morning if needed    ASPIRIN EC 81 MG EC TABLET    Take 1 tablet by mouth daily. ESOMEPRAZOLE MAGNESIUM (NEXIUM) 20 MG PACK    Take 20 mg by mouth daily    IBUPROFEN (ADVIL;MOTRIN) 600 MG TABLET    Take 1 tablet by mouth 3 times daily (with meals)    IPRATROPIUM-ALBUTEROL (DUONEB) 0.5-2.5 (3) MG/3ML SOLN NEBULIZER SOLUTION    Take 3 mLs by nebulization 2 times daily for 7 days    LEVOFLOXACIN (LEVAQUIN) 500 MG TABLET    Take 500 mg by mouth daily    NITROGLYCERIN (NITROSTAT) 0.4 MG SL TABLET    Place 1 tablet under the tongue every 5 minutes as needed for Chest pain       ALLERGIES     is allergic to tylenol with codeine #3 [acetaminophen-codeine]; flagyl [metronidazole]; pcn [penicillins]; prednisone; sulfa antibiotics; and cefdinir. FAMILY HISTORY     indicated that her mother is . She indicated that her father is . She indicated that her sister is . She indicated that two of her three brothers are alive. family history includes Arthritis in her father; Cancer in her father and sister; Diabetes in her mother; Heart Disease in her brother and mother; High Blood Pressure in her mother; Kidney Disease in her brother, brother, and mother. SOCIAL HISTORY    reports that she quit smoking about 2 years ago. Her smoking use included cigarettes.  She has never used smokeless tobacco. She reports that she does labyrinthitis, Ménière's disease. DIAGNOSTIC RESULTS     EKG: All EKG's are interpreted by the Emergency Department Physician who either signs or Co-signs this chart in the absence of a cardiologist.  EKG interpreted by Heather Montes DO:    EKG read and interpreted by myself with comparison to 29-Mar-2019 gives impression of sinus rhythm with short MD but otherwise normal EKG with heart rate of 71; interval 108; QRS 78;QTc 445; axis 74 72 62. RADIOLOGY: non-plain film images(s) such as CT, Ultrasound and MRI are read by the radiologist.    [address] [place] [address]   Final Result   1. Negative CT for acute pathology changes. 2. Redemonstration of suspected small lacunar type infarct change versus prominent vessel space of the left lateral basal ganglia. .   **This report has been created using voice recognition software. It may contain minor errors which are inherent in voice recognition technology. **      Final report electronically signed by Dr. Singh Le on 4/15/2019 2:55 AM          LABS:   Labs Reviewed   CBC WITH AUTO DIFFERENTIAL - Abnormal; Notable for the following components:       Result Value    RBC 4.09 (*)     Hematocrit 36.1 (*)     All other components within normal limits   BASIC METABOLIC PANEL - Abnormal; Notable for the following components:    CO2 21 (*)     Calcium 8.0 (*)     All other components within normal limits   HEPATIC FUNCTION PANEL - Abnormal; Notable for the following components:     Total Bilirubin 0.2 (*)     Total Protein 5.7 (*)     All other components within normal limits   URINALYSIS WITH MICROSCOPIC - Abnormal; Notable for the following components:    Leukocyte Esterase, Urine MODERATE (*)     All other components within normal limits   GLOMERULAR FILTRATION RATE, ESTIMATED - Abnormal; Notable for the following components:    Est, Glom Filt Rate 85 (*)     All other components within normal limits   BRAIN NATRIURETIC PEPTIDE   LIPASE   TROPONIN MAGNESIUM   TSH WITHOUT REFLEX   ETHANOL   URINE DRUG SCREEN   ANION GAP   OSMOLALITY       EMERGENCY DEPARTMENT COURSE:   Vitals:    Vitals:    04/15/19 0159 04/15/19 0313 04/15/19 0417   BP: (!) 140/78 121/65 122/72   Pulse: 70 63 65   Resp: 18     Temp: 98.2 °F (36.8 °C)     TempSrc: Oral     SpO2: 98% 100% 98%   Weight: 100 lb (45.4 kg)       2:12 AM: The patient was seen andevaluated. Appropriate labs were ordered and reviewed. Patient was given fluid bolus here. Orthostatic vital signs were obtained. Patient's vital signs were normal however the patient becomes symptomatic during that time. Upon physical exam, I appreciated hyperactive whole body shaking, but no other acute findings. Labs revealed dehydration, with no alcohol or drug use found. Imaging revealed via CT Head WO Contrast: redemonstration of suspected small lacunar type infarct change versus prominent vessel space of the left lateral basal ganglia. Patient had a coronary artery catheterization in 2015. This may be cardiac related in nature. CT of the head was negative. I consulted Dr. Mary Gil (internal medicine) who graciously agreed to admit the patient. CRITICALCARE:   None     CONSULTS:  Dr. Mary Gil (internal medicine)    PROCEDURES:  None    FINAL IMPRESSION      1. Near syncope    2. Hx of coronary artery disease          DISPOSITION/PLAN   Admit    PATIENT REFERRED TO:  Marquis James MD  [address] [facility]  [address]  [facility] [address]  [phone]            DISCHARGE MEDICATIONS:  New Prescriptions    No medications on file       (Please note that portions of this note were completed with a voice recognition program.  Efforts weremade to edit the dictations but occasionally words are mis-transcribed.)    Scribe:  Carol Rob 4/15/19 2:12 AM Scribing for and in the presence ofRupesh Hartmann DO.     Scribe: Carol Rob 4/15/19 2:12 AM    Provider:  I personally performed the services described in the documentation, reviewed and edited the documentation which was dictated to the scribe inmy presence, and it accurately records my words and actions.     Malorie Mata DO 4/15/19 5:47 AM        Malorie Mata DO  04/15/19 6764

## 2019-04-15 NOTE — ED NOTES
Patient refuses for orthostatics at this time due to her being dizzy.       Elizabet Helm RN  04/15/19 9139

## 2019-04-15 NOTE — DISCHARGE INSTR - COC
Status/Restrictions: [address] [facility] Weight [facility]}  Other Medical Equipment (for information only, NOT a DME order):  {EQUIPMENT:[id number]}  Other Treatments: ***    Patient's personal belongings (please select all that are sent with patient):  {CHP DME Belongings:102939876}    RN SIGNATURE:  {Esignature:[id number]}    CASE MANAGEMENT/SOCIAL WORK SECTION    Inpatient Status Date: ***    Readmission Risk Assessment Score:  Readmission Risk              Risk of Unplanned Readmission:        0           Discharging to Facility/ Agency   · Name:   · Address:  · Phone:  · Fax:    Dialysis Facility (if applicable)   · Name:  · Address:  · Dialysis Schedule:  · Phone:  · Fax:    / signature: {Esignature:[id number]}    PHYSICIAN SECTION

## 2019-04-15 NOTE — ED NOTES
Patient resting with eyes closed. Respirations easy and unlabored. Call light in reach.  at bedside.       Kapil Ramirez RN  04/15/19 7056

## 2019-04-15 NOTE — ED NOTES
Pt sleeping in bed, no signs of distress, respirs easy and unlabored, will continue to monitor     Deepa Luis RN  04/15/19 4442

## 2019-04-15 NOTE — ED TRIAGE NOTES
Patient brought in for dizziness. Patient states she was sleeping woke up feeling like she needed to use the restroom. Patient became dizzy and felt like she was going to pass out. Patient on cot and at times states she is dizzy. Daughter doesn't want patient moved only by her. Daughter trying to give patient something to drink told to please wait until MD checks patient and orders are completed.

## 2019-04-15 NOTE — PROGRESS NOTES
Hospitalist Progress Note    Patient:  Morro Cuellar      Unit/Bed:8B-34/034-A    YOB: 1960    MRN: [id number]       Acct: [de-identified]     PCP: Vargas Ponce MD    Date of Admission: 4/15/2019    Chief Complaint:   Chief Complaint   Patient presents with    Tachycardia    Dizziness         Hospital Course:     Please see H/P for details. In summary, this is a 60 y.o. Female, with PMH CAD, Hyperlipidemia, PVD, asthma, anxiety, who presented to [facility] on 4/15/19 with complaints of dizziness that occur on laying down and standing. This is accompanied by palpitations. She denies chest pain, sob, diarrhea, melena, hematochezia, N/V, HA, focal weakness/numbness, blurry vision. In ER, CT head showed no acute findings. Patient was admitted under Hospital Medicine service. Neurology was consulted. She was seen by Dr. Ninoska Cho ( neurology) on 4/15/19, who states that \" neuro exam normal, the fact that her symptoms are easily exacerbated by rapidly standing up point to a cardiac origin, patient does not have Parkinson disease, she has no parkinson cardinal features, she has benign essential tremor. ok to con't primidone 50mg qdaily for tremor. f/u with Dr. Sophia Isidro in 1 month for tremor. neurology will sign off\". Cardiology was consulted. Subjective:     Patient seen and examined. daughters at bedside during my rounds. Pt states she still feel dizzy. Feels lightheaded on standing and change of position, not on head movement. Denies chest pain, sob and palpitations. Had palpitations last night, now resolved. Denies diarrhea, melena, hematochezia, HA, focal weakness/numbness, blurry vision, tinnitus.        Medications:  Reviewed    Infusion Medications   Scheduled Medications    aspirin EC  81 mg Oral Daily    pantoprazole  40 mg Oral Daily    sodium chloride flush  10 mL Intravenous 2 times per day    primidone  50 mg Oral Daily     PRN Meds: ALPRAZolam, sodium chloride report has been created using voice recognition software. It may contain minor errors which are inherent in voice recognition technology. **      Final report electronically signed by Dr. Shannon Massey on 4/15/2019 2:55 AM            Assessment/Plan: This is a 60 y.o. Female      1. Dizziness, suspect BPPV    -PT/OT consult for vestibular PT  -cont meclizine   -neuro saw the pt. Normal neuro exam, neuro signed off. Appreciate neuro input  -CT head 4/15/19: no acute findings  -awaiting cardiology c/s    2. Non-AG metabolic acidosis, mild    -BMP in am  -IVF    3. Essential tremor    -cont primidone  -f/u w neuro in 1 month after DC    4. CAD, s/p cath 3/12/15 LAD MID 60% ( 50 TO 70%) STENOSIS, LCX MILD DS, RCA-P, EDP 19 MMHG, EF 65 TO 70%- FFR OF THE MID LAD=0.97    -cont asa/NTG  -when asked why why not on BB and statin, ?was took off  -follows Dr. Isac Crook    5. Anxiety     -on prn xanax    6.  Asthma, w/o exacerbation    -cont Duoneb prn        Anticipated Discharge in : 1-2 days         Diet: DIET GENERAL; No Added Salt (3-4 GM)    DVT prophylaxis: [] Lovenox                                 [] SCDs                                 [] SQ Heparin                                 [] Encourage ambulation           [] Already on Anticoagulation     Disposition:    [] Home       [] TCU       [] Rehab       [] Psych       [] SNF       [] [place]       [x] Other-Plan as above       Code Status: Full Code    PT/OT Eval Status: consulted       Electronically signed by Jeana Garcia MD on 4/15/2019 at 4:04 PM

## 2019-04-15 NOTE — H&P
History & Physical        Patient:  Raeann Alcaraz  YOB: 1960    MRN: [id number]     Acct: [de-identified]    PCP: Isaac Celis MD    Date of Admission: 4/15/2019    Date of Service: Pt seen/examined on 4/15/2019   and Admitted to Inpatient with expected LOS greater than two midnights due to medical therapy. Chief Complaint:   Chief Complaint   Patient presents with    Tachycardia    Dizziness         History Of Present Illness:      60 y.o. female who presented to [address] [place] [address] with complaints of dizziness. Patient reports episodes of dizziness while laying down at rest. Patient states episodes come and go. She states that the room feels as it its spinning. Patient having difficulty standing as it produces similar symptoms. Patient admits to having nausea with dry heaves. Patient admits to angina symptoms during this past week. Denies chest pain at this time. Denies shortness of breath. Denies abdominal pain. Denies V/D/C. Patient to be admitted for angina and dizziness.      Past Medical History:          Diagnosis Date    Allergic rhinitis     seasonal    Angina, class I ([facility]     Anxiety     Asthma     CAD (coronary artery disease)     Cancer (HCC)     cells in colon removed    Cancer ([facility]     cervical    Chronic back pain     Hyperlipidemia     Neuropathy     Peripheral vascular disease (HCC)     bad circulation in left leg    Tobacco abuse     Unspecified cerebral artery occlusion with cerebral infarction     mini    Unspecified sleep apnea     patient snores       Past Surgical History:          Procedure Laterality Date    CARDIAC CATHETERIZATION  15, , 06     SECTION      2    COLON SURGERY  2011    COLONOSCOPY      ENDOMETRIAL ABLATION  2001    MYRINGOTOMY AND TYMPANOSTOMY TUBE PLACEMENT  yrs ago    POLYPECTOMY  2013    colon    SPINE SURGERY      cervical fusion       Medications Prior to Admission:      Prior to Admission medications °F (36.8 °C) (Oral)   Resp 18   Wt 100 lb (45.4 kg)   SpO2 100%   BMI 19.53 kg/m²     General appearance:  No apparent distress, appears stated age and cooperative. HEENT:  Normal cephalic, atraumatic without obvious deformity. Pupils equal, round, and reactive to light. Extra ocular muscles intact. Conjunctivae/corneas clear. Neck: Supple, with full range of motion. No jugular venous distention. Trachea midline. Respiratory:  Normal respiratory effort. Clear to auscultation, bilaterally without Rales/Wheezes/Rhonchi. Cardiovascular:  Regular rate and rhythm with normal S1/S2 without murmurs, rubs or gallops. Abdomen: Soft, non-tender, non-distended with normal bowel sounds. Musculoskeletal:  No clubbing, cyanosis or edema bilaterally. Full range of motion without deformity. Skin: Skin color, texture, turgor normal.  No rashes or lesions. Neurologic:  Neurovascularly intact without any focal sensory/motor deficits. Cranial nerves: II-XII intact, grossly non-focal.  Psychiatric:  Alert and oriented, thought content appropriate, normal insight  Capillary Refill: Brisk,< 3 seconds   Peripheral Pulses: +2 palpable, equal bilaterally       Labs:     Recent Labs     04/15/19  0200   WBC 6.2   HGB 12.1   HCT 36.1*        Recent Labs     04/15/19  0200      K 3.5      CO2 21*   BUN 15   CREATININE 0.7   CALCIUM 8.0*     Recent Labs     04/15/19  0200   AST 15   ALT 11   BILIDIR <0.2   BILITOT 0.2*   ALKPHOS 92     No results for input(s): INR in the last 72 hours. No results for input(s): [place] in the last 72 hours. Urinalysis:      Lab Results   Component Value Date    NITRU NEGATIVE 04/15/2019    WBCUA 15-25 04/15/2019    BACTERIA NONE 04/15/2019    RBCUA 3-5 04/15/2019    BLOODU NEGATIVE 04/15/2019    SPECGRAV 1.014 04/15/2019    GLUCOSEU NEGATIVE 06/20/2015       Radiology:         CT HEAD WO CONTRAST   Final Result   1. Negative CT for acute pathology changes.       2. Redemonstration of suspected small lacunar type infarct change versus prominent vessel space of the left lateral basal ganglia. .   **This report has been created using voice recognition software. It may contain minor errors which are inherent in voice recognition technology. **      Final report electronically signed by Dr. Tarah Kelly on 4/15/2019 2:55 AM               DVT prophylaxis: [x] Lovenox                                 [] SCDs                                 [] SQ Heparin                                 [] Encourage ambulation           [] Already on Anticoagulation    Code Status: Full Code      PT/OT Eval Status: Encouraged, if warranted    Disposition:    [x] Home       [] TCU       [] Rehab       [] Psych       [] SNF       [] [place]       [] Other-    ASSESSMENT:    Active Hospital Problems    Diagnosis Date Noted    Dizziness [R42] 04/15/2019     Priority: High    Chest pain, atypical [R07.89] 03/05/2015     Priority: Medium    CAD (coronary artery disease)s/p cath 3/12/15-LM-P, LAD MID 60% ( 50 TO 70%) STENOSIS, LCX MILD DS, RCA-P, EDP 19 MMHG, EF 65 TO 70%- FFR OF THE MID LAD=0.97- med RX [I25.10] 03/12/2015    Family history of premature CAD- brothers  [Z82.49] 03/05/2015    Anxiety [F41.9]     Asthma [J45.909]     Angina, class I ([address] [I20.9]     Chronic back pain [M54.9, G89.29]     Neuropathy [G62.9]        PLAN:    Admit to Telemetry Unit  Diet regular  IVF hydration as tolerated  Analgesics PRN  Antiemetics PRN  DVT prophylaxis  PT/OT  IS  Troponin trend  Cardiology consult  Neurology consult  Orthostatics  Will need to re-evaluate home medications in morning  Continue to monitor closely          Thank you Isaac Celis MD for the opportunity to be involved in this patient's care.     Electronically signed by Mathieu Harper DO on 4/15/2019 at 4:43 AM

## 2019-04-15 NOTE — ED NOTES
Went to place patient on bedpan. Patient will not try until family leaves. Family is digging metal out of her dads arm at the moment.       Calderon Chopra RN  04/15/19 [id number]

## 2019-04-15 NOTE — ED NOTES
IV line per EMS was removed because it was actually an arterial line. Pressure was held for 5 minutes with no bleeding noted.       Watson Esteves, HERO  04/15/19 9966

## 2019-04-15 NOTE — CONSULTS
NEUROLOGY CONSULT NOTE      Requesting Physician: Kavya Tucker DO    Reason for Consult:  Evaluate for lightheadedness    History of Present Illness:  Araceli Carrington is a 60 y.o. female admitted to [address] [place] [address] on 4/15/2019.       60year old woman with significant CAD, c/o very bad lightheadedness when she suddenly stand up or sit up. No nausea or vomit, but this comes with a headache, when she lies down it get better and resolved. Patient has been having tremor, and was given a diagnosis of Parkinson disease by a doctor who is not a neurologist, she never seen a neurologist. She was given primidone to help control the tremor. Reports no chest pain. No shortness of breath with exertion. Reports no neck pain. No vision changes. No dysphagia. No fever. No rash. No weight loss. Past Medical History:        Diagnosis Date    Allergic rhinitis     seasonal    Angina, class I ([address]     Anxiety     Asthma     CAD (coronary artery disease)     Cancer ([address]     cells in colon removed    Cancer ([address]     cervical    Chronic back pain     Hyperlipidemia     Neuropathy     Parkinson disease ([address]     Peripheral vascular disease ([address]     bad circulation in left leg    Tobacco abuse     Unspecified cerebral artery occlusion with cerebral infarction     mini    Unspecified sleep apnea     patient snores           Procedure Laterality Date    CARDIAC CATHETERIZATION  15, 08, 06     SECTION      2    COLON SURGERY  2011    COLONOSCOPY      ENDOMETRIAL ABLATION      MYRINGOTOMY AND TYMPANOSTOMY TUBE PLACEMENT  yrs ago    POLYPECTOMY  2013    colon    SPINE SURGERY      cervical fusion       Allergies: Allergies   Allergen Reactions    Tylenol With Codeine #3 [Acetaminophen-Codeine] Shortness Of Breath    Flagyl [Metronidazole] Other (See Comments)     Rectal bleeding    Gabapentin      Pt states it made her crazy.  Things moving     Pcn [Penicillins] Hives    Prednisone      Sores inside mouth, stomach burning    Sulfa Antibiotics Hives    Cefdinir Nausea And Vomiting        Current Medications:     ALPRAZolam (XANAX) tablet 0.25 mg Q6H PRN   aspirin EC tablet 81 mg Daily   pantoprazole (PROTONIX) tablet 40 mg Daily   sodium chloride flush 0.9 % injection 10 mL 2 times per day   sodium chloride flush 0.9 % injection 10 mL PRN   magnesium hydroxide (MILK OF MAGNESIA) 400 MG/5ML suspension 30 mL Daily PRN   ondansetron (ZOFRAN) injection 4 mg Q6H PRN   acetaminophen (TYLENOL) tablet 650 mg Q4H PRN        Social History:  Social History     Tobacco Use   Smoking Status Former Smoker    Types: Cigarettes    Last attempt to quit: 10/27/2016    Years since quittin.4   Smokeless Tobacco Never Used     Social History     Substance and Sexual Activity   Alcohol Use No     Social History     Substance and Sexual Activity   Drug Use No     Legally     Family History:       Problem Relation Age of Onset    High Blood Pressure Mother     Heart Disease Mother     Diabetes Mother     Kidney Disease Mother     Arthritis Father     Cancer Father     Cancer Sister     Kidney Disease Brother     Kidney Disease Brother     Heart Disease Brother        Review of Systems:  All systems reviewed are negative except what is mentioned in history of present illness. Physical Exam:  /68   Pulse 57   Temp 98.3 °F (36.8 °C) (Oral)   Resp 16   Ht 5' (1.524 m)   Wt 107 lb 1.6 oz (48.6 kg)   SpO2 99%   BMI 20.92 kg/m²  I Body mass index is 20.92 kg/m².  I Wt Readings from Last 1 Encounters:   04/15/19 107 lb 1.6 oz (48.6 kg)           General appearance - alert, well appearing, and in no distress, oriented to  person, place, and time and weight  Mental status -Level of Alertness: awake  Orientation: person, place, time  Memory: normal  Fund of Knowledge: normal  Attention/Concentration: normal  Language: normal. Mood is normal  Neck - supple, no significant adenopathy, carotids upstroke normal bilaterally, no carotid bruits. There is no LAP in the neck. There is no thyroid enlargement. Neurological -   Cranial Pqzdlc-BS-WTS:   Cranial nerve II: Normal. There is full visual fields  Cranial nerve III: Pupils: equal, round, reactive to light   Cranial nerves III, IV, VI: Extraocular Movements: intact   Cranial nerve V: Facial sensation: intact   Cranial nerve VII:Facial strength: intact   Cranial nerve VIII: Hearing: intact   Cranial nerve IX: Palate Elevation intact bilaterally  Cranial nerve XI: Shoulder shrug intact bilaterally  Cranial nerve XII: Tongue midline   neck supple without rigidity    Motor exam is 5/5 in the upper and lower extremities . Normal muscle tone  There is no muscle atrophy. There is no muscle fasciculation, + hands tremor made worse by using it  Sensory is intact   Coordination: finger to nose intact  Gait and station: the minute patient stood up, she c/o very bad headache and lightheadedness    Abnormal movement none. vibration normal, proprioception normal   Skin - no rashes or lesions  Superficial temporal artery pulses are normal.   Musculoskeletal: Has no hand arthritis, no limitation of ROM in any of the four extremities. no joint tenderness, deformity or swelling. There is no leg edema. The Heart was regular in rate and rhythm. No heart murmur  Chest- Clear  Abdomen: Soft, Intact bowel sounds. Labs:    CBC: Recent Labs     04/15/19  0200   WBC 6.2   HGB 12.1      MCV 88.3   MCH 29.6   MCHC 33.5     CMP:  Recent Labs     04/15/19  0200      K 3.5      CO2 21*   BUN 15   CREATININE 0.7   LABGLOM 85*   GLUCOSE 104   CALCIUM 8.0*     Liver: Recent Labs     04/15/19  0200   AST 15   ALT 11   ALKPHOS 92   PROT 5.7*   LABALBU 3.6   BILITOT 0.2*     MRI BRAIN:  No results found for this or any previous visit. No results found for this or any previous visit. No results found for this or any previous visit.   No results found for this or any previous visit. No results found for this or any previous visit. No results found for this or any previous visit. No results found for this or any previous visit. Results for orders placed during the hospital encounter of 04/15/19   CT HEAD WO CONTRAST    Narrative PROCEDURE: CT HEAD WO CONTRAST    CLINICAL INFORMATION: dizziness, headache. COMPARISON: April 12, 2016    TECHNIQUE: Noncontrast 5 mm axial images were obtained through the brain. All CT scans at this facility use dose modulation, iterative reconstruction, and/or weight-based dosing when appropriate to reduce radiation dose to as low as reasonably achievable. FINDINGS:    There are no fractures. There is no acute hemorrhage, midline shift, mass effect, extra-axial fluid, hydrocephalus, or acute large vessel infarct. A small hypodensity in the lateral left basal ganglia adjacent to the subinsular gyrus is noted old lacunar   infarct changes suspected versus a prominent vascular space. The paranasal sinuses remain clear      Impression 1. Negative CT for acute pathology changes. 2. Redemonstration of suspected small lacunar type infarct change versus prominent vessel space of the left lateral basal ganglia. .  **This report has been created using voice recognition software. It may contain minor errors which are inherent in voice recognition technology. **    Final report electronically signed by Dr. Raman Haney on 4/15/2019 2:55 AM         We reviewed the patient records and available information in the EHR       Impression:    Principal Problem:    Dizziness  Active Problems:    Asthma    Anxiety    Chronic back pain    Neuropathy    Angina, class I (HCC)    Chest pain, atypical    Family history of premature CAD- brothers     CAD (coronary artery disease)s/p cath 3/12/15-LM-P, LAD MID 60% ( 50 TO 70%) STENOSIS, LCX MILD DS, RCA-P, EDP 19 MMHG, EF 65 TO 70%- FFR OF THE MID LAD=0.97- med RX  Resolved Problems:    * No resolved hospital problems. *    62 year old woman with likely severe orthostatic hypotension due to her cardiac issues. Recommendations:  - neuro exam normal  - the fact that her symptoms are easily exacerbated by rapidly standing up point to a cardiac origin  - patient does not have Parkinson disease, she has no parkinson cardinal features, she has benign essential tremor.  - ok to con't primidone 50mg qdaily for tremor  - f/u with Dr. Michael Pike in 1 month for tremor.  - neurology will sign off                                            1. Case was discussed with primary service. 2. All questions were answered. It was my pleasure to evaluate Daphine Neigh today. Please call with questions.       Electronically signed by Kevan Snellen, MD on 4/15/2019 at 12:13 PM

## 2019-04-16 VITALS
TEMPERATURE: 97.8 F | HEART RATE: 58 BPM | SYSTOLIC BLOOD PRESSURE: 115 MMHG | WEIGHT: 107.1 LBS | BODY MASS INDEX: 21.03 KG/M2 | RESPIRATION RATE: 18 BRPM | DIASTOLIC BLOOD PRESSURE: 56 MMHG | HEIGHT: 60 IN | OXYGEN SATURATION: 97 %

## 2019-04-16 LAB
ANION GAP SERPL CALCULATED.3IONS-SCNC: 8 MEQ/L (ref 8–16)
BUN BLDV-MCNC: 19 MG/DL (ref 7–22)
CALCIUM SERPL-MCNC: 8.2 MG/DL (ref 8.5–10.5)
CHLORIDE BLD-SCNC: 110 MEQ/L (ref 98–111)
CO2: 22 MEQ/L (ref 23–33)
CREAT SERPL-MCNC: 0.7 MG/DL (ref 0.4–1.2)
ERYTHROCYTE [DISTWIDTH] IN BLOOD BY AUTOMATED COUNT: 14 % (ref 11.5–14.5)
ERYTHROCYTE [DISTWIDTH] IN BLOOD BY AUTOMATED COUNT: 47.1 FL (ref 35–45)
GFR SERPL CREATININE-BSD FRML MDRD: 85 ML/MIN/1.73M2
GLUCOSE BLD-MCNC: 101 MG/DL (ref 70–108)
HCT VFR BLD CALC: 38 % (ref 37–47)
HEMOGLOBIN: 12.3 GM/DL (ref 12–16)
LACTIC ACID: 1.1 MMOL/L (ref 0.5–2.2)
MAGNESIUM: 2.1 MG/DL (ref 1.6–2.4)
MCH RBC QN AUTO: 29.5 PG (ref 26–33)
MCHC RBC AUTO-ENTMCNC: 32.4 GM/DL (ref 32.2–35.5)
MCV RBC AUTO: 91.1 FL (ref 81–99)
PLATELET # BLD: 175 THOU/MM3 (ref 130–400)
PMV BLD AUTO: 10.4 FL (ref 9.4–12.4)
POTASSIUM SERPL-SCNC: 4.4 MEQ/L (ref 3.5–5.2)
RBC # BLD: 4.17 MILL/MM3 (ref 4.2–5.4)
SODIUM BLD-SCNC: 140 MEQ/L (ref 135–145)
WBC # BLD: 5.6 THOU/MM3 (ref 4.8–10.8)

## 2019-04-16 PROCEDURE — 97165 OT EVAL LOW COMPLEX 30 MIN: CPT

## 2019-04-16 PROCEDURE — 2709999900 HC NON-CHARGEABLE SUPPLY

## 2019-04-16 PROCEDURE — 80048 BASIC METABOLIC PNL TOTAL CA: CPT

## 2019-04-16 PROCEDURE — 36415 COLL VENOUS BLD VENIPUNCTURE: CPT

## 2019-04-16 PROCEDURE — 97110 THERAPEUTIC EXERCISES: CPT

## 2019-04-16 PROCEDURE — 97163 PT EVAL HIGH COMPLEX 45 MIN: CPT

## 2019-04-16 PROCEDURE — 6370000000 HC RX 637 (ALT 250 FOR IP): Performed by: INTERNAL MEDICINE

## 2019-04-16 PROCEDURE — 97112 NEUROMUSCULAR REEDUCATION: CPT

## 2019-04-16 PROCEDURE — 99239 HOSP IP/OBS DSCHRG MGMT >30: CPT | Performed by: FAMILY MEDICINE

## 2019-04-16 PROCEDURE — 2580000003 HC RX 258: Performed by: INTERNAL MEDICINE

## 2019-04-16 PROCEDURE — 85027 COMPLETE CBC AUTOMATED: CPT

## 2019-04-16 PROCEDURE — 83735 ASSAY OF MAGNESIUM: CPT

## 2019-04-16 PROCEDURE — 99223 1ST HOSP IP/OBS HIGH 75: CPT | Performed by: INTERNAL MEDICINE

## 2019-04-16 PROCEDURE — 83605 ASSAY OF LACTIC ACID: CPT

## 2019-04-16 PROCEDURE — 97116 GAIT TRAINING THERAPY: CPT

## 2019-04-16 PROCEDURE — 6370000000 HC RX 637 (ALT 250 FOR IP): Performed by: PSYCHIATRY & NEUROLOGY

## 2019-04-16 RX ORDER — PRIMIDONE 50 MG/1
50 TABLET ORAL DAILY
Qty: 30 TABLET | Refills: 0 | Status: SHIPPED | OUTPATIENT
Start: 2019-04-17

## 2019-04-16 RX ADMIN — PRIMIDONE 50 MG: 50 TABLET ORAL at 09:32

## 2019-04-16 RX ADMIN — Medication 10 ML: at 09:35

## 2019-04-16 RX ADMIN — PANTOPRAZOLE SODIUM 40 MG: 40 TABLET, DELAYED RELEASE ORAL at 09:32

## 2019-04-16 RX ADMIN — ASPIRIN 81 MG: 81 TABLET, COATED ORAL at 09:33

## 2019-04-16 ASSESSMENT — PAIN DESCRIPTION - DESCRIPTORS: DESCRIPTORS: CONSTANT;BURNING;TINGLING

## 2019-04-16 ASSESSMENT — PAIN SCALES - GENERAL
PAINLEVEL_OUTOF10: 6
PAINLEVEL_OUTOF10: 2
PAINLEVEL_OUTOF10: 2
PAINLEVEL_OUTOF10: 10

## 2019-04-16 ASSESSMENT — PAIN DESCRIPTION - PAIN TYPE: TYPE: CHRONIC PAIN

## 2019-04-16 ASSESSMENT — PAIN DESCRIPTION - ORIENTATION
ORIENTATION: RIGHT
ORIENTATION: DISTAL

## 2019-04-16 ASSESSMENT — PAIN DESCRIPTION - LOCATION
LOCATION: BACK;LEG;BUTTOCKS
LOCATION: BACK;LEG

## 2019-04-16 ASSESSMENT — PAIN DESCRIPTION - FREQUENCY: FREQUENCY: CONTINUOUS

## 2019-04-16 NOTE — CARE COORDINATION
4/16/19, 7:45 AM      Sol Hutchins       Admitted from: ER 4/15/2019/ [address] day: 1   Location: [address] [place] [address] Reason for admit: Dizziness [R42] Status: Inpt  Admit order signed?: yes  PMH:  has a past medical history of Allergic rhinitis, Angina, class I ([address] Anxiety, Asthma, CAD (coronary artery disease), Cancer ([address] Cancer ([address] Chronic back pain, Hyperlipidemia, Neuropathy, Parkinson disease ([address] Peripheral vascular disease ([address] Tobacco abuse, Unspecified cerebral artery occlusion with cerebral infarction, and Unspecified sleep apnea. Procedure: No  Pertinent abnormal Imaging:No  Medications:  Scheduled Meds:   aspirin EC  81 mg Oral Daily    pantoprazole  40 mg Oral Daily    sodium chloride flush  10 mL Intravenous 2 times per day    primidone  50 mg Oral Daily     Continuous Infusions:   Pertinent Info/Orders/Treatment Plan:  Telemetry. Consults to Cardiology and Neurology. PT/OT. Orthostatic VS.   Diet: DIET GENERAL; No Added Salt (3-4 GM)   Smoking status:  reports that she quit smoking about 2 years ago. Her smoking use included cigarettes. She has never used smokeless tobacco.   PCP: Luis Sorenson MD  Readmission: No  Readmission Risk Score: 8%    Discharge Planning  Current Residence:  Private Residence  Living Arrangements:  Family Members, Children, Spouse/Significant Other   Support Systems:  Children, Family Members, Spouse/Significant Other  Current Services PTA:     Potential Assistance Needed:  N/A  Potential Assistance Purchasing Medications:  Yes  Does patient want to participate in local refill/ meds to beds program?  Yes  Type of Home Care Services:  None  Patient expects to be discharged to:  home  Expected Discharge date:  04/19/19  Follow Up Appointment: Best Day/ Time:      Discharge Plan: Met with pt today. She is from home where she lives with a daughter and a sister. She has no services or DME's. She works, she drives and she has no issues getting medications. She has a PCP. She denies home going needs.  Evaluation: no    4/16/19, 10:59 AM    Discharge plan discussed by  and . Discharge plan reviewed with patient/ family. Patient/ family verbalize understanding of discharge plan and are in agreement with plan. Understanding was demonstrated using the teach back method. Potential discharge in next 24 hours. Pt denies home going needs.

## 2019-04-16 NOTE — PROGRESS NOTES
[facility] due to that on sunday brent. Pt pleasant and agreeable to testing for BPPV. General:  Overall Orientation Status: Within Functional Limits  Follows Commands: Within Functional Limits    Vision: Impaired  Vision Exceptions: Wears glasses at all times    Hearing: Within functional limits         Pain:  Yes. Pain Assessment  Pain Assessment: 0-10  Pain Level: 10(in bed , releived with ambulation , then pain came down from a 10/10 to a 3-4 /10 )  Pain Type: Chronic pain(h/o sciatic pain, exacerbated with hospital stay and vertigo causing pt to stay in bed more.)  Pain Location: Back;Leg;Buttocks  Pain Orientation: Right       Social/Functional History:    Lives With: Daughter  Type of Home: House  Home Layout: One level  Home Access: Stairs to enter without rails  Entrance Stairs - Number of Steps: 1 lara   Home Equipment: Rolling walker, 4 wheeled walker          Receives Help From: Family  ADL Assistance: Independent  Homemaking Assistance: Independent  Ambulation Assistance: Independent  Transfer Assistance: Independent       Type of occupation: works at [employer]  Additional Comments: Pt was not using AD prior to [employer] has a RW she is willing to use at home until vertigo subsides .        Objective:       RLE AROM: WFL         LLE AROM : WFL          Strength RLE: Exception  Comment: limited due to pain from sciatica    Strength LLE: WNL       Balance  Sitting - Static: Good  Sitting - Dynamic: Fair, +  Standing - Static: Good, -  Standing - Dynamic: Fair, +    Rolling to Right: Stand by assistance(due to vertigo )  Supine to Sit: Contact guard assistance(from rigth sidelying during the BPPV testing due to vertigo)  Sit to Supine: Stand by assistance  Scooting: Stand by assistance    Transfers  Sit to Stand: Stand by assistance(following BPPV testing and treatment ,pt reported much less vertigo )  Stand to sit: Stand by assistance(to toilet and chair )       Ambulation 1  Surface: level tile  Device: Hand-Held Assist  Assistance: Minimal assistance  Quality of Gait: pt able to look right and left while walking with min c/os of vertigo, much improved per pt/Pt rated vertigo after treatment to a 2/10 from a 10/10 prior to treatment . Pt willing to use RW at home until vertigo subsides   Distance: 100 feet          BPPV testing- Pt + for sachin traylor pike left and right , worse on left, so pt was treated then with epley maneuver for the left ear. Exercises:  Comments: Pt instructed in Donahue-Daroff exercises to do at home for BPPV     Activity Tolerance:  Activity Tolerance: Patient Tolerated treatment well  Activity Tolerance: Pt with sciatic pain on right especially when in bed ,improved with ambualtion. Vertigo is improved after BPPV treatment     Treatment Initiated: see ex,pt also treated with epley maneuver by therapist to left ear for canaliith repositioning . Pt ambulated in her room with CGA 20 feet X 2 with no actual LOB, min c/os vertigo with head movements after BPPV treatment . Assessment: Body structures, Functions, Activity limitations: Decreased functional mobility , Decreased endurance, Decreased strength, Decreased balance  Assessment: Pt with positive sachin traylor pike right and left with left more severe. Pt was treated this date by this PT with Epley manuever left ear. Pt with improved symptoms of vertigo after the traetment . Rec use of RW at home and no driving until symtpoms resolve . Pt was given a HEP of Godfrey-clayoff exericses shree until can get into out pt PT for follow up of vestibular issues. Pt will be seen here in hospital by PT to continue to treat the BPPV as well. Prognosis: Good    Clinical Presentation: High - Unstable with Unpredictable Characteristics: Pt with positive sachin traylor pike right and left with left more severe. Pt was treated this date by this PT with Epley manuever left ear. Pt with improved symptoms of vertigo after the traetment . Rec use of RW at home and no driving until symtpoms resolve . Pt was given a HEP of Godfrey-jennifer catalanes shree until can get into out pt PT for follow up of vestibular issues. Pt will be seen here in hospital by PT to continue to treat the BPPV as well.:    Decision Making: High Complexitybased on patient assessment and decision making process of determining plan of care and establishing reasonable expectations for measurable functional outcomes    REQUIRES PT FOLLOW UP: Yes    Discharge Recommendations:  Discharge Recommendations: Outpatient PT, 24 hour supervision or assist, Patient would benefit from continued therapy after discharge    Patient Education:  Patient Education: POC, HEP, the need for out pt PT for vestibular treatments, use of RW at home until symptoms subside , no driving until no longer having vertigo    Equipment Recommendations:  Equipment Needed: No  Other: has Rw at home she can use    Safety:  Type of devices: All fall risk precautions in place, Call light within reach, Gait belt, Nurse notified, Left in chair  Restraints  Initially in place: No    Plan:  Times per week: 6 X vest  Times per day: Daily  Specific instructions for Next Treatment: Godfrey-jennifer ex , mobility,  gait with head turns.  progress to gait with varying speeds controlled by therapist  , continue to monitor BPPV with screenings and treatment with canalith repositioning  as needed   Current Treatment Recommendations: Gait Training, Stair training, Balance Training, Functional Mobility Training, Transfer Training, Home Exercise Program    Goals:  Patient goals : Go home with RW use until BPPV better and HEP and OP PT for vestibular treatments   Short term goals  Time Frame for Short term goals: 1 week  Short term goal 1: bed mobility with Mod i with vertigo c/os less than a 2/10  Short term goal 2: sit to stand with vertigo c/os less than 2/10 to reduce fall risk  Short term goal 3: ambulate with least restrictive device  over 200 feet with S to walk community distances without  risk of falls   Short term goal 4: pt to have a decrease in frequency of vertigo symptoms by 75% with head and trunk movements and no greater than 1/10 in severity of the vertigo to decrease fall risk   Long term goals  Time Frame for Long term goals : NA due to short ELOS    Evaluation Complexity: Based on the findings of patient history, examination, clinical presentation, and decision making during this evaluation, the evaluation of Melly Alas  is of high complexity.             AM-PAC Inpatient Mobility without Stair Climbing Raw Score : 15  AM-PAC Inpatient without Stair Climbing T-Scale Score : 43.03  Mobility Inpatient CMS 0-100% Score: 47.43  Mobility Inpatient without Stair CMS G-Code Modifier : CK

## 2019-04-16 NOTE — PLAN OF CARE
Problem: Falls - Risk of:  Goal: Will remain free from falls  Description  Will remain free from falls  Outcome: Ongoing  Note:   Patient free from accidental injury. Bed in low locked position, side rails up x2. Call light and personal belongings within reach. Problem: Pain Control  Goal: Maintain pain level at or below patient's acceptable level (or 5 if patient is unable to determine acceptable level)  Outcome: Ongoing  Note:   Pt complains of nerve pain in right leg. Ice applied and patient repositioned. Problem: Neurological  Goal: Maximum potential motor/sensory/cognitive function  Outcome: Ongoing  Note:   Pt alert and oriented x4. Problem: Cardiovascular  Goal: Hemodynamic stability  Outcome: Ongoing  Note:   Vitals stable. NSR noted on telemetry. Problem: Skin Integrity/Risk  Goal: No skin breakdown during hospitalization  Outcome: Ongoing  Note:   No skin breakdown noted this shift. Patient encouraged to change position frequently. Care plan reviewed with patient. Patient verbalize understanding of the plan of care and contribute to goal setting.

## 2019-04-16 NOTE — PROGRESS NOTES
Discharge teaching and instructions for diagnosis/procedure of veritgo completed with patient using teachback method. AVS reviewed. Printed prescriptions given to patient. Patient voiced understanding regarding prescriptions, follow up appointments, and care of self at home.  Discharged in a wheelchair to  home with support per family

## 2019-04-16 NOTE — PROGRESS NOTES
Hospitalist Progress Note    Patient:  Mayuri Abdalla      Unit/Bed:8B-34/034-A    YOB: 1960    MRN: [id number]       Acct: [de-identified]     PCP: Kaci Nix MD    Date of Admission: 4/15/2019    Chief Complaint:   Chief Complaint   Patient presents with    Tachycardia    Dizziness       Hospital Course:     Please see H/P for details. In summary, this is a 60 y.o. Female, with PMH CAD, Hyperlipidemia, PVD, asthma, anxiety, who presented to [address] [place] [address] on 4/15/19 with complaints of dizziness that occur on laying down and standing. This is accompanied by palpitations. She denies chest pain, sob, diarrhea, melena, hematochezia, N/V, HA, focal weakness/numbness, blurry vision. In ER, CT head showed no acute findings. Patient was admitted under Hospital Medicine service. Neurology was consulted. She was seen by Dr. Aminah Viera ( neurology) on 4/15/19, who states that \" neuro exam normal, the fact that her symptoms are easily exacerbated by rapidly standing up point to a cardiac origin, patient does not have Parkinson disease, she has no parkinson cardinal features, she has benign essential tremor. ok to con't primidone 50mg qdaily for tremor. f/u with Dr. Jamshid Ibrahim in 1 month for tremor. neurology will sign off\".    Cardiology was consulted. Subjective:     Patient seen and examined. Pt states dizziness resolved after vestibular PT. She states she feels better today. She denies chest pain, sob and palpitations, diarrhea, melena, hematochezia, HA, focal weakness/numbness, blurry vision, tinnitus.          Medications:  Reviewed    Infusion Medications   Scheduled Medications    aspirin EC  81 mg Oral Daily    pantoprazole  40 mg Oral Daily    sodium chloride flush  10 mL Intravenous 2 times per day    primidone  50 mg Oral Daily     PRN Meds: ALPRAZolam, sodium chloride flush, magnesium hydroxide, ondansetron, acetaminophen, meclizine      Intake/Output Summary (Last 24 hours) at 4/16/2019 1404  Last data filed at 4/16/2019 0823  Gross per 24 hour   Intake 1165 ml   Output 600 ml   Net 565 ml       Diet:  DIET GENERAL; No Added Salt (3-4 GM)    Exam:  BP (!) 115/56   Pulse 58   Temp 97.8 °F (36.6 °C) (Oral)   Resp 18   Ht 5' (1.524 m)   Wt 107 lb 1.6 oz (48.6 kg)   SpO2 97%   BMI 20.92 kg/m²     General appearance: alert, not in acute distress. HEENT: Pupils equal, round, and reactive to light. Conjunctivae clear. Clear oral mucosa. Neck: Supple, with full range of motion. Respiratory:  Normal respiratory effort. Clear to auscultation, bilaterally without Rales/Wheezes/Rhonchi. Cardiovascular: normal rate, regular rhythm with normal S1/S2 without murmurs, rubs or gallops. Musculoskeletal: passive and active ROM x 4 extremities. Neurological exam reveals alert, oriented x3, normal speech, no focal findings or movement disorder noted. Gait normal.       Labs:   Recent Labs     04/15/19  0200 04/16/19  0550   WBC 6.2 5.6   HGB 12.1 12.3   HCT 36.1* 38.0    175     Recent Labs     04/15/19  0200 04/16/19  0550    140   K 3.5 4.4    110   CO2 21* 22*   BUN 15 19   CREATININE 0.7 0.7   CALCIUM 8.0* 8.2*     Recent Labs     04/15/19  0200   AST 15   ALT 11   BILIDIR <0.2   BILITOT 0.2*   ALKPHOS 92     No results for input(s): INR in the last 72 hours. No results for input(s): Clarance Bustamante in the last 72 hours. Urinalysis:      Lab Results   Component Value Date    NITRU NEGATIVE 04/15/2019    WBCUA 15-25 04/15/2019    BACTERIA NONE 04/15/2019    RBCUA 3-5 04/15/2019    BLOODU NEGATIVE 04/15/2019    SPECGRAV 1.014 04/15/2019    GLUCOSEU NEGATIVE 06/20/2015       Radiology:  CT HEAD WO CONTRAST   Final Result   1. Negative CT for acute pathology changes. 2. Redemonstration of suspected small lacunar type infarct change versus prominent vessel space of the left lateral basal ganglia.       .   **This report has been created using voice recognition software. It may contain minor errors which are inherent in voice recognition technology. **      Final report electronically signed by Dr. Renay Carbone on 4/15/2019 2:55 AM            Assessment/Plan: This is a 60 y.o. Female        1. Dizziness, suspect BPPV       -PT/OT on-board for vestibular PT. Pt felt better after vestibular PT. Cont PT in OP.   -cont meclizine   -neuro saw the pt. Normal neuro exam, neuro signed off. Appreciate neuro input  -CT head 4/15/19: no acute findings  -cardiology on-board. No further cardiac work-up      2. Non-AG metabolic acidosis, mild, improving      -repeat BMP in OP  -pt was hydrated w IVF     3. Essential tremor     -cont primidone  -f/u w neuro in 1 month after DC     4. CAD, s/p cath 3/12/15 LAD MID 60% ( 50 TO 70%) STENOSIS, LCX MILD DS, RCA-P, EDP 19 MMHG, EF 65 TO 70%- FFR OF THE MID LAD=0.97     -cont asa/NTG  -when asked why why not on BB and statin, ?was took off  -follows Dr. Davey Rodriguez. Advised follow-up with Dr. Chinmay Sherman ( Cardiology) in 2 weeks      5. Anxiety      -on prn xanax     6.  Asthma, w/o exacerbation     -cont Duoneb prn         Diet: DIET GENERAL; No Added Salt (3-4 GM)    DVT prophylaxis: [] Lovenox                                 [] SCDs                                 [] SQ Heparin                                 [] Encourage ambulation           [] Already on Anticoagulation     Disposition:    [x] Home       [] TCU       [] Rehab       [] Psych       [] SNF       [] [facility]       [x] Other-Plan as above       Code Status: Full Code    PT/OT Eval Status: on-board       Electronically signed by Michelle Bee MD on 4/16/2019 at 2:04 PM

## 2019-04-16 NOTE — DISCHARGE SUMMARY
Hospital Medicine Discharge Summary      Patient Identification:   Mayi Ingram   : 1960  MRN: [id number]   Account: [de-identified]      Patient's PCP: Jaz Proctor MD    Admit Date: 4/15/2019     Discharge Date:   2019     Admitting Physician: Bethany Chacon DO     Discharge Physician: Jaspreet Stevenson MD     Discharge Diagnoses:      1. Dizziness, suspect BPPV, improved   2. Non-AG metabolic acidosis, mild, improving    3. Essential tremor  4. CAD, s/p cath 3/12/15 LAD MID 60% ( 50 TO 70%) STENOSIS, LCX MILD DS, RCA-P, EDP 19 MMHG, EF 65 TO 70%- FFR OF THE MID LAD=0.97   5. Anxiety   6. Asthma, w/o exacerbation      The patient was seen and examined on day of discharge and this discharge summary is in conjunction with any daily progress note from day of discharge. Hospital Course:        Please see H/P for details. In summary, Mayi Ingram is U 54 y.o. Female, with PMH CAD, Hyperlipidemia, PVD, asthma, anxiety, who presented to [facility] on 4/15/19 with complaints of dizziness that occur on laying down and standing. This is accompanied by palpitations. She denies chest pain, sob, diarrhea, melena, hematochezia, N/V, HA, focal weakness/numbness, blurry vision. In ER, CT head showed no acute findings. EKG on 4/15/19 showed sinus rhythm with short NM otherwise normal EKG. Patient was admitted under Hospital Medicine service. Neurology was consulted. She was seen by Dr. Arthur Muse ( neurology) on 4/15/19, who states that \" neuro exam normal, the fact that her symptoms are easily exacerbated by rapidly standing up point to a cardiac origin, patient does not have Parkinson disease, she has no parkinson cardinal features, she has benign essential tremor. ok to con't primidone 50mg qdaily for tremor. f/u with Dr. Emerald Szymanski in 1 month for tremor. neurology will sign off\".    Cardiology saw the patient and no further cardiac work-up recommended.  Patient's dizziness improved after vestibular PT. Please see below for details of hospital course:      1. Dizziness, suspect BPPV, improved         -PT/OT on-board for vestibular PT. Pt felt better after vestibular PT. Cont PT in OP.   -cont meclizine   -neuro saw the pt. Normal neuro exam, neuro signed off. Appreciate neuro input  -CT head 4/15/19: no acute findings   -EKG on 4/15/19 showed sinus rhythm with short MT otherwise normal EKG  -cardiology on-board. No further cardiac work-up      2. Non-AG metabolic acidosis, mild, improving      -repeat BMP in OP  -pt was hydrated with IVF     3. Essential tremor     -cont primidone  -f/u w neuro in 1 month after DC     4. CAD, s/p cath 3/12/15 LAD MID 60% ( 50 TO 70%) STENOSIS, LCX MILD DS, RCA-P, EDP 19 MMHG, EF 65 TO 70%- FFR OF THE MID LAD=0.97     -cont asa/NTG  -when asked patient why not on BB and statin, ?was took off  -follows Dr. Negrita Lehman. Advised follow-up with Dr. Kusum Calvin ( Cardiology) in 2 weeks      5. Anxiety      -on prn xanax     6. Asthma, w/o exacerbation     -cont Duoneb prn         Exam:     Vitals:  Vitals:    04/16/19 0000 04/16/19 0430 04/16/19 0818 04/16/19 1226   BP: (!) 102/52 118/67 134/71 (!) 115/56   Pulse: 61 68 58 58   Resp: 16 16 16 18   Temp: 98 °F (36.7 °C) 98 °F (36.7 °C) 97.8 °F (36.6 °C) 97.8 °F (36.6 °C)   TempSrc: Oral Oral Oral Oral   SpO2: 97% 98% 99% 97%   Weight:       Height:         Weight: Weight: 107 lb 1.6 oz (48.6 kg)     24 hour intake/output:    Intake/Output Summary (Last 24 hours) at 4/16/2019 1410  Last data filed at 4/16/2019 0823  Gross per 24 hour   Intake 1165 ml   Output 600 ml   Net 565 ml       General appearance: alert, not in acute distress. HEENT: Pupils equal, round, and reactive to light. Conjunctivae clear. Clear oral mucosa. Neck: Supple, with full range of motion. Respiratory:  Normal respiratory effort. Clear to auscultation, bilaterally without Rales/Wheezes/Rhonchi.   Cardiovascular: normal rate, regular rhythm with normal S1/S2 without murmurs, rubs or gallops. Musculoskeletal: passive and active ROM x 4 extremities. Neurological exam reveals alert, oriented x3, normal speech, no focal findings or movement disorder noted. Gait normal.           Labs: For convenience and continuity at follow-up the following most recent labs are provided:      CBC:    Lab Results   Component Value Date    WBC 5.6 04/16/2019    HGB 12.3 04/16/2019    HCT 38.0 04/16/2019     04/16/2019       Renal:    Lab Results   Component Value Date     04/16/2019    K 4.4 04/16/2019     04/16/2019    CO2 22 04/16/2019    BUN 19 04/16/2019    CREATININE 0.7 04/16/2019    CALCIUM 8.2 04/16/2019         Significant Diagnostic Studies    Radiology:   CT HEAD WO CONTRAST   Final Result   1. Negative CT for acute pathology changes. 2. Redemonstration of suspected small lacunar type infarct change versus prominent vessel space of the left lateral basal ganglia. .   **This report has been created using voice recognition software. It may contain minor errors which are inherent in voice recognition technology. **      Final report electronically signed by Dr. Renay Carbone on 4/15/2019 2:55 AM             Consults:     STR ED TO IP CONSULT  IP CONSULT TO CARDIOLOGY  IP CONSULT TO NEUROLOGY    Disposition:    [x] Home       [] TCU       [] Rehab       [] Psych       [] SNF       [] [facility]       [] Other-    Condition at Discharge: Stable    Code Status:  Full Code     Patient Instructions:    Discharge lab work: BMP   Activity: activity as tolerated  Diet: DIET GENERAL; No Added Salt (3-4 GM)      Follow-up visits:   Daivd Chaidez MD  [address] [facility]  [address]  [facility] [address] [phone]               Discharge Medications:      Valentina Lee   Home Medication Instructions PUM:[id number]    Printed on:04/16/19 1410   Medication Information                      ALPRAZolam (XANAX) 0.25 MG tablet  Take 0.25 mg

## 2019-04-16 NOTE — PROGRESS NOTES
Francis Mccarthy 59  INPATIENT OCCUPATIONAL THERAPY  [facility] MED SURG 8B  EVALUATION    Time:  Time In: 9750  Time Out: 1123  Timed Code Treatment Minutes: 0 Minutes  Minutes: 9          Date: 2019  Patient Name: Chauncey Hua,   Gender: female      MRN: [id number]  : 1960  (60 y.o.)  Referring Practitioner: Anthony Fam MD  Diagnosis: Dizziness  Additional Pertinent Hx: Chauncey Hua is a 60 y.o. female who presents to the Emergency Department for the evaluation of tachycardia and dizziness that began today at 12:30am when the patient woke up. The patient states her heart was \"pounding hard\", and she was so dizzy, she could not open her eyes. She stated she had to crawl on the ground to get help without passing out, because change in her position to sitting up caused increased dizziness.     Restrictions/Precautions:  Fall Risk, General Precautions                    Other position/activity restrictions: vertigo,+ BPPV, sciatica right exacerbated now        Past Medical History:   Diagnosis Date    Allergic rhinitis     seasonal    Angina, class I ([address]     Anxiety     Asthma     CAD (coronary artery disease)     Cancer ([address]     cells in colon removed    Cancer ([address]     cervical    Chronic back pain     Hyperlipidemia     Neuropathy     Parkinson disease ([address]     Peripheral vascular disease ([address]     bad circulation in left leg    Tobacco abuse     Unspecified cerebral artery occlusion with cerebral infarction     mini    Unspecified sleep apnea     patient snores     Past Surgical History:   Procedure Laterality Date    CARDIAC CATHETERIZATION  15, 08, 06     SECTION      2    COLON SURGERY  2011    COLONOSCOPY      ENDOMETRIAL ABLATION  2001    MYRINGOTOMY AND TYMPANOSTOMY TUBE PLACEMENT  yrs ago    POLYPECTOMY  2013    colon    SPINE SURGERY      cervical fusion           Subjective  Chart Reviewed: Yes  Patient assessed for rehabilitation services?: Yes  Family / Caregiver Present: No    Subjective: Pleasant and cooperative    General:  Overall Orientation Status: Within Normal Limits    Vision: Impaired  Vision Exceptions: Wears glasses at all times    Hearing: Within functional limits         Pain:  Pain Assessment  Patient Currently in Pain: Yes  Pain Assessment: 0-10  Pain Level: 2  Pain Location: (Sciatic nerve)       Social/Functional History:  Lives With: Family(and daughter)  Type of Home: House  Home Layout: One level  Home Access: Stairs to enter without rails  Entrance Stairs - Number of Steps: 1 lara   Home Equipment: Rolling walker, 4 wheeled walker     Bathroom Shower/Tub: Walk-in shower  Bathroom Toilet: Standard    Receives Help From: Family  ADL Assistance: Independent  Homemaking Assistance: Independent  Homemaking Responsibilities: Yes    Ambulation Assistance: Independent  Transfer Assistance: Independent    Active : Yes  Occupation: Full time employment  Type of occupation: works at [employer]  Additional Comments: Pt was not using AD prior to [employer] has a RW she is willing to use at home until vertigo subsides . Objective        Overall Cognitive Status: WNL                    LUE AROM (degrees)  LUE AROM : WNL          RUE AROM (degrees)  RUE AROM : WNL       LUE Strength  Gross LUE Strength: WFL                RUE Strength  Gross RUE Strength: WFL         ADL  LE Dressing: Independent  Additional Comments: pt reports completing sponge bath on own prior to OT session.      Bed mobility  Supine to Sit: Modified independent  Sit to Supine: Modified independent  Scooting: Modified independent    Transfers  Sit to stand: Modified independent  Stand to sit: Modified independent    Balance  Sitting Balance: Independent  Standing Balance: Modified independent      Time: in prep for mobility     Functional Mobility  Functional - Mobility Device: No device  Activity: Other  Assist Level: Modified independent   Functional Mobility Comments: within room, min unsteadiness        Activity Tolerance:  Activity Tolerance: n/a--pt denies OT needs at this time. Assessment:  Assessment: Defer further balance, mobility and endurance training to PT. Prognosis: Good  No Skilled OT: At baseline function, Independent with ADL's, Safe to return home, No OT goals identified    Clinical Decision Making: Clinical Decision making was of Low Complexity as the result of analysis of data from a problem focused assessment, a consideration of a limited number of treatment options, no significant comorbidities affecting the plan of care and no modification or assistance required to complete the evaluation. Discharge Recommendations:  Discharge Recommendations: Home with assist PRN    Patient Education:  Patient Education: home safety    Equipment Recommendations:  Equipment Needed: No    Safety:  Safety Devices in place: Yes  Type of devices: Left in bed, Call light within reach    Plan:  Times per week: n/a    Goals:       Short term goals  Time Frame for Short term goals: No STG d/t discharge OT    Evaluation Complexity: Based on the findings of patient history, examination, clinical presentation, and decision making during this evaluation, this patient is of low complexity.

## 2019-04-16 NOTE — CONSULTS
release capsule Take 10 mg by mouth daily   Yes Historical Provider, MD   Aspirin-Caffeine (RASHID BACK & BODY PO) Take 1 tablet by mouth 2 times daily   Yes Historical Provider, MD   ibuprofen (ADVIL;MOTRIN) 600 MG tablet Take 1 tablet by mouth 3 times daily (with meals) 3/29/18  Yes Governor Mauro MD   aspirin EC 81 MG EC tablet Take 1 tablet by mouth daily. 3/13/15  Yes Charly Xiong MD   ALPRAZolam [place]) 0.25 MG tablet Take 0.25 mg by mouth every 6 hours as needed for Anxiety.  Patient normally takes 1 tab at night and 1 in the morning if needed   Yes Historical Provider, MD   ipratropium-albuterol (Darion [place]) 0.5-2.5 (3) MG/3ML SOLN nebulizer solution Take 3 mLs by nebulization 2 times daily for 7 days 12/1/18 12/8/18  SANDOR Coughlin - CNP   nitroGLYCERIN (NITROSTAT) 0.4 MG SL tablet Place 1 tablet under the tongue every 5 minutes as needed for Chest pain 10/27/17   Radha Turcios MD       Current Facility-Administered Medications   Medication Dose Route Frequency Provider Last Rate Last Dose    ALPRAZolam [place]) tablet 0.25 mg  0.25 mg Oral Q6H PRN Raynaldo Dempsey, DO        aspirin EC tablet 81 mg  81 mg Oral Daily Raynaldo Dempsey, DO   81 mg at 04/16/19 0933    pantoprazole (PROTONIX) tablet 40 mg  40 mg Oral Daily Raynaldo Dempsey, DO   40 mg at 04/16/19 0932    sodium chloride flush 0.9 % injection 10 mL  10 mL Intravenous 2 times per day Raynaldo Dempsey, DO   10 mL at 04/16/19 0935    sodium chloride flush 0.9 % injection 10 mL  10 mL Intravenous PRN Raynaldo Dempsey, DO        magnesium hydroxide (MILK OF MAGNESIA) 400 MG/5ML suspension 30 mL  30 mL Oral Daily PRN Raynaldo Dempsey, DO        [facility]etron [facility]) injection 4 mg  4 mg Intravenous Q6H PRN Raynaldo Dempsey, DO        acetaminophen (TYLENOL) tablet 650 mg  650 mg Oral Q4H PRN Raynaldo Dempsey, DO   650 mg at 04/15/19 1802    primidone (MYSOLINE) tablet 50 mg  50 mg Oral Daily Leodan Hou MD   50 mg at 04/16/19 0944    meclizine (ANTIVERT) tablet 12.5 mg  12.5 mg Oral TID PRN Ma Sola Montesinos MD   12.5 mg at 04/15/19 9077       Allergies:  Tylenol with codeine #3 [acetaminophen-codeine]; Flagyl [metronidazole]; Gabapentin; Pcn [penicillins]; Prednisone; Sulfa antibiotics; and Cefdinir    Social History:    TOBACCO:   reports that she quit smoking about 2 years ago. Her smoking use included cigarettes. She has never used smokeless tobacco.  ETOH:   reports that she does not drink alcohol. Family History:        Problem Relation Age of Onset    High Blood Pressure Mother     Heart Disease Mother     Diabetes Mother     Kidney Disease Mother     Arthritis Father     Cancer Father     Cancer Sister     Kidney Disease Brother     Kidney Disease Brother     Heart Disease Brother          Review of Systems -   General ROS: negative  Psychological ROS: negative  Hematological and Lymphatic ROS: No history of blood clots or bleeding disorder. Respiratory ROS: no cough, shortness of breath, or wheezing  Cardiovascular ROS: As per HPI  Gastrointestinal ROS: negative  Genito-Urinary ROS: no dysuria, trouble voiding, or hematuria  Musculoskeletal ROS: negative  Neurological ROS: no TIA or stroke symptoms  Dermatological ROS: negative    All others reviewed and are negative.        BP (!) 115/56   Pulse 58   Temp 97.8 °F (36.6 °C) (Oral)   Resp 18   Ht 5' (1.524 m)   Wt 107 lb 1.6 oz (48.6 kg)   SpO2 97%   BMI 20.92 kg/m²       Physical Examination:   General appearance - alert, in no distress  Mental status - alert, oriented to person, place, and time  Neck - supple, no significant adenopathy, no JVD, or carotid bruits  Chest - clear to auscultation, no wheezes, rales or rhonchi, symmetric air entry  Heart - normal rate, regular rhythm, normal S1, S2, no murmurs, rubs, clicks or gallops  Abdomen - soft, nontender, nondistended, no masses or organomegaly  Neurological - alert, oriented, normal speech, no focal findings or movement disorder noted  Musculoskeletal - no joint tenderness, deformity or swelling  Extremities - peripheral pulses normal, no pedal edema, no clubbing or cyanosis  Skin - normal coloration and turgor, no rashes, no suspicious skin lesions noted      LABS:    Recent Labs     04/15/19  0200 04/15/19  0848 04/15/19  1410   TROPONINT < 0.010 < 0.010 < 0.010     CBC:   Lab Results   Component Value Date    WBC 5.6 04/16/2019    RBC 4.17 04/16/2019    HGB 12.3 04/16/2019    HCT 38.0 04/16/2019    MCV 91.1 04/16/2019    MCH 29.5 04/16/2019    MCHC 32.4 04/16/2019    RDW 13.9 03/29/2018     04/16/2019    MPV 10.4 04/16/2019     BMP:    Lab Results   Component Value Date     04/16/2019    K 4.4 04/16/2019     04/16/2019    CO2 22 04/16/2019    BUN 19 04/16/2019    LABALBU 3.6 04/15/2019    CREATININE 0.7 04/16/2019    CALCIUM 8.2 04/16/2019    LABGLOM 85 04/16/2019    GLUCOSE 101 04/16/2019     Hepatic Function Panel:    Lab Results   Component Value Date    ALKPHOS 92 04/15/2019    ALT 11 04/15/2019    AST 15 04/15/2019    PROT 5.7 04/15/2019    BILITOT 0.2 04/15/2019    BILIDIR <0.2 04/15/2019    LABALBU 3.6 04/15/2019     Magnesium:    Lab Results   Component Value Date    MG 2.1 04/16/2019     Warfarin PT/INR:  No components found for: PTPATWAR, PTINRWAR  HgBA1c:  No results found for: LABA1C  FLP:    Lab Results   Component Value Date    TRIG 56 03/12/2015    HDL 39 03/12/2015    LDLCALC 91 03/12/2015     TSH:    Lab Results   Component Value Date    TSH 3.220 04/15/2019     BNP: No components found for: PRO-BNP      Assessment/Plan:  Dizziness/BPPV  Non-obstructive CAD  HLD    Reports her symptoms have resolved after vestibular physiotherapy  No current symptoms, back to baseline  Continue Aspirin,   Consider low dose statin  Follow up with Dr. Sana Gambino as outpatient  No further cardiac workup      Please do note hesitate to contact me for any further questions.    Thank you for the opportunity to be involved in this patient's care.     Code Status: Full Code    Electronically signed by Carter Mcnair MD on 4/16/2019 at 1:57 PM

## 2019-04-23 ENCOUNTER — OFFICE VISIT (OUTPATIENT)
Dept: CARDIOLOGY CLINIC | Age: 59
End: 2019-04-23

## 2019-04-23 VITALS
HEART RATE: 84 BPM | HEIGHT: 59 IN | BODY MASS INDEX: 20.96 KG/M2 | DIASTOLIC BLOOD PRESSURE: 68 MMHG | SYSTOLIC BLOOD PRESSURE: 123 MMHG | WEIGHT: 104 LBS

## 2019-04-23 DIAGNOSIS — I25.10 CORONARY ARTERY DISEASE INVOLVING NATIVE CORONARY ARTERY OF NATIVE HEART WITHOUT ANGINA PECTORIS: Primary | ICD-10-CM

## 2019-04-23 DIAGNOSIS — R42 DIZZINESS: ICD-10-CM

## 2019-04-23 DIAGNOSIS — R42 VERTIGO: ICD-10-CM

## 2019-04-23 PROCEDURE — 99213 OFFICE O/P EST LOW 20 MIN: CPT | Performed by: NURSE PRACTITIONER
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
## 2019-04-23 NOTE — PROGRESS NOTES
Pt here for hospital follow up-dizziness    EKG done on 4-     Pt denies:chest pain,SOB,palpitations,peripheral edema    Pt complains of:dizziness

## 2019-12-20 ENCOUNTER — HOSPITAL ENCOUNTER (OUTPATIENT)
Age: 59
Setting detail: SPECIMEN
Discharge: HOME | End: 2019-12-20

## 2019-12-26 LAB — DERMATOLOGY PATHOLOGY REPORT: NORMAL

## 2021-07-01 ENCOUNTER — HOSPITAL ENCOUNTER (OUTPATIENT)
Age: 61
Setting detail: SPECIMEN
Discharge: HOME | End: 2021-07-01

## 2021-07-06 LAB
CHLAMYDIA BY THIN PREP: NEGATIVE
N. GONORRHOEAE DNA, THIN PREP: NEGATIVE
SPECIMEN DESCRIPTION: NORMAL

## 2021-07-07 LAB
HPV SAMPLE: NORMAL
HPV, GENOTYPE 16: NOT DETECTED
HPV, GENOTYPE 18: NOT DETECTED
HPV, HIGH RISK OTHER: NOT DETECTED
HPV, INTERPRETATION: NORMAL
SPECIMEN DESCRIPTION: NORMAL

## 2021-07-08 LAB — CYTOLOGY REPORT: NORMAL

## 2022-08-09 ENCOUNTER — HOSPITAL ENCOUNTER (EMERGENCY)
Age: 62
Discharge: HOME | End: 2022-08-09
Attending: STUDENT IN AN ORGANIZED HEALTH CARE EDUCATION/TRAINING PROGRAM

## 2022-08-09 ENCOUNTER — APPOINTMENT (OUTPATIENT)
Dept: GENERAL RADIOLOGY | Age: 62
End: 2022-08-09

## 2022-08-09 VITALS
WEIGHT: 103 LBS | RESPIRATION RATE: 16 BRPM | HEIGHT: 59 IN | TEMPERATURE: 100.3 F | BODY MASS INDEX: 20.76 KG/M2 | HEART RATE: 69 BPM | OXYGEN SATURATION: 96 % | SYSTOLIC BLOOD PRESSURE: 96 MMHG | DIASTOLIC BLOOD PRESSURE: 57 MMHG

## 2022-08-09 DIAGNOSIS — U07.1 COVID: Primary | ICD-10-CM

## 2022-08-09 DIAGNOSIS — J18.9 PNEUMONIA OF RIGHT LOWER LOBE DUE TO INFECTIOUS ORGANISM: ICD-10-CM

## 2022-08-09 LAB
ALBUMIN SERPL-MCNC: 3.8 G/DL (ref 3.5–5.1)
ALP BLD-CCNC: 82 U/L (ref 38–126)
ALT SERPL-CCNC: 13 U/L (ref 11–66)
ANION GAP SERPL CALCULATED.3IONS-SCNC: 11 MEQ/L (ref 8–16)
AST SERPL-CCNC: 14 U/L (ref 5–40)
BASOPHILS # BLD: 0.7 %
BASOPHILS ABSOLUTE: 0 THOU/MM3 (ref 0–0.1)
BILIRUB SERPL-MCNC: 0.2 MG/DL (ref 0.3–1.2)
BILIRUBIN DIRECT: < 0.2 MG/DL (ref 0–0.3)
BUN BLDV-MCNC: 15 MG/DL (ref 7–22)
C-REACTIVE PROTEIN: < 0.3 MG/DL (ref 0–1)
CALCIUM SERPL-MCNC: 8.8 MG/DL (ref 8.5–10.5)
CHLORIDE BLD-SCNC: 101 MEQ/L (ref 98–111)
CO2: 22 MEQ/L (ref 23–33)
CREAT SERPL-MCNC: 0.8 MG/DL (ref 0.4–1.2)
EOSINOPHIL # BLD: 1.5 %
EOSINOPHILS ABSOLUTE: 0.1 THOU/MM3 (ref 0–0.4)
ERYTHROCYTE [DISTWIDTH] IN BLOOD BY AUTOMATED COUNT: 14 % (ref 11.5–14.5)
ERYTHROCYTE [DISTWIDTH] IN BLOOD BY AUTOMATED COUNT: 44.7 FL (ref 35–45)
GFR SERPL CREATININE-BSD FRML MDRD: 72 ML/MIN/1.73M2
GLUCOSE BLD-MCNC: 106 MG/DL (ref 70–108)
HCT VFR BLD CALC: 38.7 % (ref 37–47)
HEMOGLOBIN: 13.2 GM/DL (ref 12–16)
IMMATURE GRANS (ABS): 0.01 THOU/MM3 (ref 0–0.07)
IMMATURE GRANULOCYTES: 0.2 %
LD: 132 U/L (ref 100–190)
LYMPHOCYTES # BLD: 8 %
LYMPHOCYTES ABSOLUTE: 0.3 THOU/MM3 (ref 1–4.8)
MCH RBC QN AUTO: 29.7 PG (ref 26–33)
MCHC RBC AUTO-ENTMCNC: 34.1 GM/DL (ref 32.2–35.5)
MCV RBC AUTO: 87.2 FL (ref 81–99)
MONOCYTES # BLD: 11.7 %
MONOCYTES ABSOLUTE: 0.5 THOU/MM3 (ref 0.4–1.3)
NUCLEATED RED BLOOD CELLS: 0 /100 WBC
OSMOLALITY CALCULATION: 269.5 MOSMOL/KG (ref 275–300)
PLATELET # BLD: 182 THOU/MM3 (ref 130–400)
PMV BLD AUTO: 10.4 FL (ref 9.4–12.4)
POTASSIUM REFLEX MAGNESIUM: 3.6 MEQ/L (ref 3.5–5.2)
PRO-BNP: 568.9 PG/ML (ref 0–900)
PROCALCITONIN: 0.04 NG/ML (ref 0.01–0.09)
RBC # BLD: 4.44 MILL/MM3 (ref 4.2–5.4)
SEDIMENTATION RATE, ERYTHROCYTE: 2 MM/HR (ref 0–20)
SEG NEUTROPHILS: 77.9 %
SEGMENTED NEUTROPHILS ABSOLUTE COUNT: 3.2 THOU/MM3 (ref 1.8–7.7)
SODIUM BLD-SCNC: 134 MEQ/L (ref 135–145)
TOTAL PROTEIN: 6 G/DL (ref 6.1–8)
TROPONIN T: < 0.01 NG/ML
WBC # BLD: 4.1 THOU/MM3 (ref 4.8–10.8)

## 2022-08-09 PROCEDURE — 2580000003 HC RX 258: Performed by: STUDENT IN AN ORGANIZED HEALTH CARE EDUCATION/TRAINING PROGRAM

## 2022-08-09 PROCEDURE — 80076 HEPATIC FUNCTION PANEL: CPT

## 2022-08-09 PROCEDURE — 85651 RBC SED RATE NONAUTOMATED: CPT

## 2022-08-09 PROCEDURE — 84145 PROCALCITONIN (PCT): CPT

## 2022-08-09 PROCEDURE — 84484 ASSAY OF TROPONIN QUANT: CPT

## 2022-08-09 PROCEDURE — 83615 LACTATE (LD) (LDH) ENZYME: CPT

## 2022-08-09 PROCEDURE — 71045 X-RAY EXAM CHEST 1 VIEW: CPT

## 2022-08-09 PROCEDURE — 93005 ELECTROCARDIOGRAM TRACING: CPT | Performed by: STUDENT IN AN ORGANIZED HEALTH CARE EDUCATION/TRAINING PROGRAM

## 2022-08-09 PROCEDURE — 85025 COMPLETE CBC W/AUTO DIFF WBC: CPT

## 2022-08-09 PROCEDURE — 6360000002 HC RX W HCPCS: Performed by: STUDENT IN AN ORGANIZED HEALTH CARE EDUCATION/TRAINING PROGRAM

## 2022-08-09 PROCEDURE — 96375 TX/PRO/DX INJ NEW DRUG ADDON: CPT

## 2022-08-09 PROCEDURE — 83880 ASSAY OF NATRIURETIC PEPTIDE: CPT

## 2022-08-09 PROCEDURE — 96365 THER/PROPH/DIAG IV INF INIT: CPT

## 2022-08-09 PROCEDURE — 86140 C-REACTIVE PROTEIN: CPT

## 2022-08-09 PROCEDURE — 6370000000 HC RX 637 (ALT 250 FOR IP): Performed by: STUDENT IN AN ORGANIZED HEALTH CARE EDUCATION/TRAINING PROGRAM

## 2022-08-09 PROCEDURE — 80048 BASIC METABOLIC PNL TOTAL CA: CPT

## 2022-08-09 PROCEDURE — 99285 EMERGENCY DEPT VISIT HI MDM: CPT

## 2022-08-09 RX ORDER — ONDANSETRON 2 MG/ML
4 INJECTION INTRAMUSCULAR; INTRAVENOUS ONCE
Status: COMPLETED | OUTPATIENT
Start: 2022-08-09 | End: 2022-08-09

## 2022-08-09 RX ORDER — DOXYCYCLINE HYCLATE 100 MG/1
100 CAPSULE ORAL 2 TIMES DAILY
Qty: 20 CAPSULE | Refills: 0 | Status: SHIPPED | OUTPATIENT
Start: 2022-08-09 | End: 2022-08-19

## 2022-08-09 RX ORDER — ACETAMINOPHEN 325 MG/1
650 TABLET ORAL ONCE
Status: COMPLETED | OUTPATIENT
Start: 2022-08-09 | End: 2022-08-09

## 2022-08-09 RX ORDER — 0.9 % SODIUM CHLORIDE 0.9 %
1000 INTRAVENOUS SOLUTION INTRAVENOUS ONCE
Status: COMPLETED | OUTPATIENT
Start: 2022-08-09 | End: 2022-08-09

## 2022-08-09 RX ORDER — NAPROXEN 500 MG/1
500 TABLET ORAL 2 TIMES DAILY PRN
Qty: 14 TABLET | Refills: 0 | Status: SHIPPED | OUTPATIENT
Start: 2022-08-09 | End: 2022-08-16

## 2022-08-09 RX ORDER — ONDANSETRON 4 MG/1
4 TABLET, FILM COATED ORAL 3 TIMES DAILY PRN
Qty: 15 TABLET | Refills: 0 | Status: SHIPPED | OUTPATIENT
Start: 2022-08-09

## 2022-08-09 RX ADMIN — ACETAMINOPHEN 650 MG: 325 TABLET ORAL at 13:28

## 2022-08-09 RX ADMIN — CEFTRIAXONE SODIUM 1000 MG: 1 INJECTION, POWDER, FOR SOLUTION INTRAMUSCULAR; INTRAVENOUS at 16:23

## 2022-08-09 RX ADMIN — SODIUM CHLORIDE 1000 ML: 9 INJECTION, SOLUTION INTRAVENOUS at 16:24

## 2022-08-09 RX ADMIN — ONDANSETRON 4 MG: 2 INJECTION INTRAMUSCULAR; INTRAVENOUS at 14:32

## 2022-08-09 RX ADMIN — SODIUM CHLORIDE 1000 ML: 9 INJECTION, SOLUTION INTRAVENOUS at 13:27

## 2022-08-09 RX ADMIN — ACETAMINOPHEN 325MG 650 MG: 325 TABLET ORAL at 17:28

## 2022-08-09 RX ADMIN — SODIUM CHLORIDE 1000 ML: 9 INJECTION, SOLUTION INTRAVENOUS at 15:19

## 2022-08-09 ASSESSMENT — PAIN - FUNCTIONAL ASSESSMENT
PAIN_FUNCTIONAL_ASSESSMENT: 0-10
PAIN_FUNCTIONAL_ASSESSMENT: 0-10

## 2022-08-09 ASSESSMENT — PAIN SCALES - GENERAL
PAINLEVEL_OUTOF10: 8
PAINLEVEL_OUTOF10: 6

## 2022-08-09 NOTE — ED PROVIDER NOTES
[address] [address]          Pt Name: Clarisse Blevins  MRN: [id number]  [place] 1960  Date of evaluation: 8/9/2022  Treating Resident Physician: Munira Tobar MD  Supervising Physician: Dr. Ayesha Esteves, DO    [address]       Chief Complaint   Patient presents with    Generalized Body Aches    Fever    Fatigue    Positive For Covid-19     History obtained from the patient. HISTORY OF PRESENT ILLNESS    HPI  Clarisse Blevins is a 60 y.o. female who presents to the emergency department for evaluation of COVID-19. Patient states she tested positive for COVID-19 today. She mentions generalized weakness fatigue. She mentions nausea without vomiting. She does mention some shortness of breath. Mentions some muscle aches. Denies any headache fever chills. Denies any chest pain. Does mention a nonproductive cough. Denies any abdominal pain. Denies any vomiting diarrhea constipation leg swelling. Denies any previous MI CVA or blood clot. Denies any long distance travel any recent surgery or procedure any hemoptysis any hormone or birth control use. She does states she had cervical cancer in the distant past as well as some precancerous polyps removed. Patient denies any new Headache, Fever, Chills, Chest pain, Abdominal pain, Vomiting, Diarrhea, Constipation, and Leg swelling. The patient has no other acute complaints at this time. REVIEW OF SYSTEMS   Review of Systems   Constitutional:  Positive for appetite change and fatigue. Negative for chills and fever. HENT:  Negative for ear pain and sinus pain. Eyes:  Negative for pain. Respiratory:  Positive for cough and shortness of breath. Cardiovascular:  Negative for chest pain and leg swelling. Gastrointestinal:  Positive for nausea. Negative for abdominal pain, constipation, diarrhea and vomiting. Genitourinary:  Negative for dysuria and flank pain. Musculoskeletal:  Positive for myalgias. Negative for back pain and neck pain. Skin:  Negative for wound. Neurological:  Positive for weakness. Negative for headaches. Psychiatric/Behavioral:  Negative for confusion. PAST MEDICAL AND SURGICAL HISTORY     Past Medical History:   Diagnosis Date    Allergic rhinitis     seasonal    Angina, class I ([facility] [address]     Anxiety     Asthma     CAD (coronary artery disease)     Cancer (HCC)     cells in colon removed    Cancer ([facility] [address]     cervical    Chronic back pain     Hyperlipidemia     Neuropathy     Parkinson disease ([facility] [address]     Peripheral vascular disease ([facility] [address]     bad circulation in left leg    Tobacco abuse     Unspecified cerebral artery occlusion with cerebral infarction     mini    Unspecified sleep apnea     patient snores     Past Surgical History:   Procedure Laterality Date    CARDIAC CATHETERIZATION  15, 08,      SECTION      2    COLON SURGERY      COLONOSCOPY      ENDOMETRIAL ABLATION      MYRINGOTOMY AND TYMPANOSTOMY TUBE PLACEMENT  yrs ago    POLYPECTOMY  2013    colon    SPINE SURGERY      cervical fusion         MEDICATIONS   No current facility-administered medications for this encounter. Current Outpatient Medications:     naproxen (NAPROSYN) 500 MG tablet, Take 1 tablet by mouth 2 times daily as needed for Pain, Disp: 14 tablet, Rfl: 0    ondansetron (ZOFRAN) 4 MG tablet, Take 1 tablet by mouth 3 times daily as needed for Nausea or Vomiting, Disp: 15 tablet, Rfl: 0    doxycycline hyclate (VIBRAMYCIN) 100 MG capsule, Take 1 capsule by mouth in the morning and 1 capsule before bedtime. Do all this for 10 days. , Disp: 20 capsule, Rfl: 0    primidone (MYSOLINE) 50 MG tablet, Take 1 tablet by mouth daily, Disp: 30 tablet, Rfl: 0    omeprazole (PRILOSEC) 10 MG delayed release capsule, Take 10 mg by mouth daily, Disp: , Rfl:     ipratropium-albuterol (DUONEB) 0.5-2.5 (3) MG/3ML SOLN nebulizer solution, Take 3 mLs by nebulization 2 times daily for 7 days, Disp: 42 mL, Rfl: 0    ibuprofen (ADVIL;MOTRIN) 600 MG tablet, Take 1 tablet by mouth 3 times daily (with meals), Disp: 30 tablet, Rfl: 0    nitroGLYCERIN (NITROSTAT) 0.4 MG SL tablet, Place 1 tablet under the tongue every 5 minutes as needed for Chest pain, Disp: 25 tablet, Rfl: 0    aspirin EC 81 MG EC tablet, Take 1 tablet by mouth daily. , Disp: 30 tablet, Rfl: 3    ALPRAZolam (XANAX) 0.25 MG tablet, Take 0.25 mg by mouth every 6 hours as needed for Anxiety. Patient normally takes 1 tab at night and 1 in the morning if needed, Disp: , Rfl:       SOCIAL HISTORY     Social History     Social History Narrative    Not on file     Social History     Tobacco Use    Smoking status: Former     Types: Cigarettes     Quit date: 10/27/2016     Years since quittin.7    Smokeless tobacco: Never   Substance Use Topics    Alcohol use: No    Drug use: No         ALLERGIES     Allergies   Allergen Reactions    Tylenol With Codeine #3 [Acetaminophen-Codeine] Shortness Of Breath    Flagyl [Metronidazole] Other (See Comments)     Rectal bleeding    Gabapentin      Pt states it made her crazy. Things moving     Pcn [Penicillins] Hives    Prednisone      Sores inside mouth, stomach burning    Sulfa Antibiotics Hives    Cefdinir Nausea And Vomiting         FAMILY HISTORY     Family History   Problem Relation Age of Onset    High Blood Pressure Mother     Heart Disease Mother     Diabetes Mother     Kidney Disease Mother     Arthritis Father     Cancer Father     Cancer Sister     Kidney Disease Brother     Kidney Disease Brother     Heart Disease Brother          PREVIOUS RECORDS   Previous records reviewed:  Patient was seen last on 2019 for cardiology office visit for CAD .         PHYSICAL EXAM     ED Triage Vitals   BP Temp Temp src Heart Rate Resp SpO2 Height Weight   22 1301 22 1258 -- 22 1258 22 1258 22 1258 22 1258 22 1258   (!) 103/59 100.3 °F (37.9 °C) 79 20 96 % 4' 11\" (1.499 m) 103 lb (46.7 kg)     Initial vital signs and nursing assessment reviewed and vitals are/show: Afebrile, Borderline hypotensive, Normocardic, and Normal RR. Pulsoximetry is normal per my interpretation. Additional Vital Signs:  Vitals:    08/09/22 1615   BP: (!) 96/57   Pulse: 69   Resp: 16   Temp:    SpO2: 96%       Physical Exam  Constitutional:       General: She is not in acute distress. Appearance: Normal appearance. She is not ill-appearing, toxic-appearing or diaphoretic. HENT:      Head: Normocephalic and atraumatic. Right Ear: External ear normal.      Left Ear: External ear normal.   Eyes:      General: No scleral icterus. Right eye: No discharge. Left eye: No discharge. Cardiovascular:      Rate and Rhythm: Normal rate and regular rhythm. Heart sounds: No murmur heard. No friction rub. No gallop. Pulmonary:      Effort: Pulmonary effort is normal. No respiratory distress. Breath sounds: Normal breath sounds. No stridor. No wheezing, rhonchi or rales. Chest:      Chest wall: No tenderness. Abdominal:      General: Abdomen is flat. There is no distension. Palpations: Abdomen is soft. Tenderness: There is no abdominal tenderness. There is no guarding or rebound. Musculoskeletal:      Cervical back: Neck supple. Right lower leg: No edema. Left lower leg: No edema. Skin:     General: Skin is warm and dry. Neurological:      Mental Status: She is alert and oriented to person, place, and time. Mental status is at baseline. Psychiatric:         Mood and Affect: Mood normal.         Behavior: Behavior normal.         Thought Content:  Thought content normal.         Judgment: Judgment normal.           MEDICAL DECISION MAKING   Initial Assessment:     59 yo female presenting to ED for COVID     Differential diagnoses include but not limited to: COVID-19 viral illness influenza pneumonia ACS ACS, arrhythmia, aortic dissection, cardiac tamponade, pneumothorax, PE, esophageal rupture, pneumonia, CHF, COPD, myocarditis, pericarditis, electrolyte abnormality, valvular heart disease, GERD, musculoskeletal dehydration    Plan:       EKG  Labs  Imaging: CXR  IV fluids  Tylenol  Zofran  Additional IV fluids  Additional IV Fluids  Rocephin  Home pulse ox  Discharge              Patient is a 60 y.o. female who was seen and evaluated in the emergency department for COVID-19. Patient tested positive for COVID-19 earlier today at home. On arrival she was borderline febrile but otherwise vital signs are stable. During her stay her blood pressure was borderline but we [place] fluids and the map was above 65 and stable. Patient was otherwise well-appearing. She had no hypoxia tachypnea or signs of respiratory distress. Lab work was pretty unremarkable. EKG showed no signs of acute ischemia. Imaging showed a possible right lower lobe infiltrate and given her symptoms we did give her a dose of Rocephin and discharged her with antibiotics at home. We also discharged her with a home pulse ox. We discussed strict return precautions as well as. Through shared decision-making, patient felt comfortable with discharge and following up with her PCP. ED RESULTS   Laboratory results:  Labs Reviewed   CBC WITH AUTO DIFFERENTIAL - Abnormal; Notable for the following components:       Result Value    WBC 4.1 (*)     Lymphocytes Absolute 0.3 (*)     All other components within normal limits   BASIC METABOLIC PANEL W/ REFLEX TO MG FOR LOW K - Abnormal; Notable for the following components:    Sodium 134 (*)     CO2 22 (*)     All other components within normal limits   HEPATIC FUNCTION PANEL - Abnormal; Notable for the following components:     Total Bilirubin 0.2 (*)     Total Protein 6.0 (*)     All other components within normal limits   GLOMERULAR FILTRATION RATE, ESTIMATED - Abnormal; Notable for the following components: Est, Glom Filt Rate 72 (*)     All other components within normal limits   OSMOLALITY - Abnormal; Notable for the following components:    Osmolality Calc 269.5 (*)     All other components within normal limits   TROPONIN   BRAIN NATRIURETIC PEPTIDE   C-REACTIVE PROTEIN   SEDIMENTATION RATE   PROCALCITONIN   LACTATE DEHYDROGENASE   ANION GAP       Radiologic studies results:  XR CHEST PORTABLE   Final Result   1. Hazy opacities seen in the right lower lobe. Findings likely relate to infiltrate. **This report has been created using voice recognition software. It may contain minor errors which are inherent in voice recognition technology. **      Final report electronically signed by Dr Greg Zapata on 8/9/2022 1:53 PM          ED Medications administered this visit:   Medications   acetaminophen (TYLENOL) tablet 650 mg (650 mg Oral Given 8/9/22 1328)   0.9 % sodium chloride bolus (0 mLs IntraVENous Stopped 8/9/22 1427)   ondansetron (ZOFRAN) injection 4 mg (4 mg IntraVENous Given 8/9/22 1432)   0.9 % sodium chloride bolus (0 mLs IntraVENous Stopped 8/9/22 1624)   0.9 % sodium chloride bolus (0 mLs IntraVENous Stopped 8/9/22 1727)   cefTRIAXone (ROCEPHIN) 1,000 mg in dextrose 5 % 50 mL IVPB mini-bag (0 mg IntraVENous Stopped 8/9/22 1727)   acetaminophen (TYLENOL) tablet 650 mg (650 mg Oral Given 8/9/22 1728)         ED COURSE     ED Course as of 08/09/22 1755   Tue Aug 09, 2022   1319 Additional IV fluids ordered. Rocephin ordered. [CR]   1434 CBC shows WBC 4.1 BMP unremarkable LDH CRP procalcitonin BNP troponin within normal limits   [CR]   1435 EKG shows no signs of acute ischemia. [CR]   5600 CXR:IMPRESSION:  1. Hazy opacities seen in the right lower lobe.  Findings likely relate to infiltrate.    [CR]   1453 ESR 2 [CR]   1511 Additional IV fluids ordered [CR]      ED Course User Index  [CR] Ronel Soria MD        Strict return precautions and follow up instructions were discussed with the patient prior to discharge, with which the patient agrees. MEDICATION CHANGES     Discharge Medication List as of 8/9/2022  5:20 PM        START taking these medications    Details   naproxen (NAPROSYN) 500 MG tablet Take 1 tablet by mouth 2 times daily as needed for Pain, Disp-14 tablet, R-0Normal      ondansetron (ZOFRAN) 4 MG tablet Take 1 tablet by mouth 3 times daily as needed for Nausea or Vomiting, Disp-15 tablet, R-0Normal      doxycycline hyclate (VIBRAMYCIN) 100 MG capsule Take 1 capsule by mouth in the morning and 1 capsule before bedtime. Do all this for 10 days. , Disp-20 capsule, R-0Normal               FINAL DISPOSITION     Final diagnoses:   COVID   Pneumonia of right lower lobe due to infectious organism     Condition: condition: stable  Dispo: Discharge to home      This transcription was electronically signed. Parts of this transcriptions may have been dictated by use of voice recognition software and electronically transcribed, and parts may have been transcribed with the assistance of an ED scribe. The transcription may contain errors not detected in proofreading. Please refer to my supervising physician's documentation if my documentation differs.     Electronically Signed: Adali Christie MD, 08/09/22, 5:55 PM         Adali Christie MD  Resident  08/09/22 6637

## 2022-08-09 NOTE — ED NOTES
Pt appears to be resting on cot. No distress noted. Respirations even and unlabored. Call light in reach. Family at bedside.       Morris Lira RN  08/09/22 5940

## 2022-08-09 NOTE — DISCHARGE INSTRUCTIONS
Take full course of antibiotics  You can use Tylenol or naproxen as needed for fever muscle aches or pain. Do not use naproxen with ibuprofen or other NSAIDs as they are similar medications  You can use Zofran as needed for nausea  Remember to eat and drink plenty of fluids to stay well-hydrated  Use the home pulse ox to check your oxygen. If it is less than 92% for 5 minutes straight return to the ED. Return to the ED if your symptoms worsen or you feel you need to be reevaluated. Follow-up with your PCP within a week.

## 2022-08-09 NOTE — ED TRIAGE NOTES
Pt to ED due to fatigue and body aches. Pt states that she started to not feel well yesterday, this morning she was at work and started to feel more fatigued and lightheaded. Pt states that her whole body hurts. Pt states that she had a positive at home covid test this morning. EKG done. VSS.

## 2022-08-10 ENCOUNTER — CARE COORDINATION (OUTPATIENT)
Dept: CARE COORDINATION | Age: 62
End: 2022-08-10

## 2022-08-10 LAB
EKG ATRIAL RATE: 80 BPM
EKG P AXIS: 69 DEGREES
EKG P-R INTERVAL: 114 MS
EKG Q-T INTERVAL: 372 MS
EKG QRS DURATION: 86 MS
EKG QTC CALCULATION (BAZETT): 429 MS
EKG R AXIS: 69 DEGREES
EKG T AXIS: 53 DEGREES
EKG VENTRICULAR RATE: 80 BPM

## 2022-08-10 PROCEDURE — 93010 ELECTROCARDIOGRAM REPORT: CPT | Performed by: INTERNAL MEDICINE

## 2022-08-10 NOTE — CARE COORDINATION
Patient contacted regarding COVID-19 diagnosis and pulse oximeter ordered at discharge. Discussed COVID-19 related testing which was available at this time. Test results were positive. Patient informed of results, if available? Yes. Ambulatory Care Manager contacted the patient by telephone to perform post discharge assessment. Call within 2 business days of discharge: Yes. Verified name and  with patient as identifiers. Provided introduction to self, and explanation of the CTN/ACM role, and reason for call due to risk factors for infection and/or exposure to COVID-19. Symptoms reviewed with patient who verbalized the following symptoms: fever  fatigue  pain or aching joints  cough  shortness of breath  dizziness/lightheadedness. Due to no new or worsening symptoms encounter was not routed to provider for escalation. Discussed follow-up appointments. If no appointment was previously scheduled, appointment scheduling offered: Yes. [facility] follow up appointment(s): No future appointments. Non-[facility] follow up appointment(s): patient calling for follow up    Non-face-to-face services provided:  Obtained and reviewed discharge summary and/or continuity of care documents     Advance Care Planning:   Does patient have an Advance Directive:  reviewed and current. Educated patient about risk for severe COVID-19 due to risk factors according to CDC guidelines. ACM reviewed discharge instructions, medical action plan and red flag symptoms with the patient who verbalized understanding. Discussed COVID vaccination status: No. Education provided on COVID-19 vaccination as appropriate. Discussed exposure protocols and quarantine with CDC Guidelines. Patient was given an opportunity to verbalize any questions and concerns and agrees to contact ACM or health care provider for questions related to their healthcare.     Reviewed and educated patient on any new and changed medications related to discharge diagnosis Was patient discharged with a pulse oximeter? yes, discussed and confirmed pulse oximeter discharge instructions and when to notify provider or seek emergency care. ACM provided contact information. Plan for follow-up call in 5-7 days based on severity of symptoms and risk factors. Advised on zone sheet, symptom management, reporting worsening symptoms, deep breathing exercises, staying active, and hydration.

## 2022-08-17 ENCOUNTER — CARE COORDINATION (OUTPATIENT)
Dept: CARE COORDINATION | Age: 62
End: 2022-08-17

## 2022-08-17 NOTE — CARE COORDINATION
You Patient resolved from the Care Transitions episode on 8/17/2022  Discussed COVID-19 related testing which was available at this time. Test results were positive. Patient informed of results, if available? Yes    Patient/family has been provided the following resources and education related to COVID-19:                         Signs, symptoms and red flags related to COVID-19            CDC exposure and quarantine guidelines            [employer] exposure contact - [phone]            Contact for their local Department of Health                 Patient currently reports that the following symptoms have improved:  no new/worsening symptoms     No further outreach scheduled with this CTN/ACM. Episode of Care resolved. Patient has this CTN/ACM contact information if future needs arise.

## 2023-10-20 ENCOUNTER — HOSPITAL ENCOUNTER (OUTPATIENT)
Age: 63
Setting detail: OBSERVATION
Discharge: HOME | End: 2023-10-21
Attending: INTERNAL MEDICINE

## 2023-10-20 ENCOUNTER — APPOINTMENT (OUTPATIENT)
Dept: GENERAL RADIOLOGY | Age: 63
End: 2023-10-20

## 2023-10-20 ENCOUNTER — APPOINTMENT (OUTPATIENT)
Dept: INTERVENTIONAL RADIOLOGY/VASCULAR | Age: 63
End: 2023-10-20

## 2023-10-20 DIAGNOSIS — R07.9 CHEST PAIN, UNSPECIFIED TYPE: ICD-10-CM

## 2023-10-20 DIAGNOSIS — R06.89 DYSPNEA AND RESPIRATORY ABNORMALITIES: Primary | ICD-10-CM

## 2023-10-20 DIAGNOSIS — R06.00 DYSPNEA AND RESPIRATORY ABNORMALITIES: Primary | ICD-10-CM

## 2023-10-20 DIAGNOSIS — R53.1 GENERAL WEAKNESS: ICD-10-CM

## 2023-10-20 LAB
ALBUMIN SERPL BCG-MCNC: 4.1 G/DL (ref 3.5–5.1)
ALP SERPL-CCNC: 91 U/L (ref 38–126)
ALT SERPL W/O P-5'-P-CCNC: 13 U/L (ref 11–66)
ANION GAP SERPL CALC-SCNC: 12 MEQ/L (ref 8–16)
APTT PPP: 35.3 SECONDS (ref 22–38)
AST SERPL-CCNC: 17 U/L (ref 5–40)
BASOPHILS ABSOLUTE: 0.1 THOU/MM3 (ref 0–0.1)
BASOPHILS NFR BLD AUTO: 0.8 %
BILIRUB CONJ SERPL-MCNC: < 0.2 MG/DL (ref 0–0.3)
BILIRUB SERPL-MCNC: 0.4 MG/DL (ref 0.3–1.2)
BUN SERPL-MCNC: 19 MG/DL (ref 7–22)
CALCIUM SERPL-MCNC: 9.4 MG/DL (ref 8.5–10.5)
CHLORIDE SERPL-SCNC: 105 MEQ/L (ref 98–111)
CK SERPL-CCNC: 98 U/L (ref 30–135)
CO2 SERPL-SCNC: 22 MEQ/L (ref 23–33)
CREAT SERPL-MCNC: 0.8 MG/DL (ref 0.4–1.2)
DEPRECATED RDW RBC AUTO: 45.9 FL (ref 35–45)
EOSINOPHIL NFR BLD AUTO: 1.4 %
EOSINOPHILS ABSOLUTE: 0.1 THOU/MM3 (ref 0–0.4)
ERYTHROCYTE [DISTWIDTH] IN BLOOD BY AUTOMATED COUNT: 14.3 % (ref 11.5–14.5)
FLUAV RNA RESP QL NAA+PROBE: NOT DETECTED
FLUBV RNA RESP QL NAA+PROBE: NOT DETECTED
GFR SERPL CREATININE-BSD FRML MDRD: > 60 ML/MIN/1.73M2
GLUCOSE SERPL-MCNC: 107 MG/DL (ref 70–108)
HCT VFR BLD AUTO: 46.2 % (ref 37–47)
HEPARIN UNFRACTIONATED: 0.1 U/ML (ref 0.3–0.7)
HEPARIN UNFRACTIONATED: < 0.04 U/ML (ref 0.3–0.7)
HGB BLD-MCNC: 15.5 GM/DL (ref 12–16)
IMM GRANULOCYTES # BLD AUTO: 0.02 THOU/MM3 (ref 0–0.07)
IMM GRANULOCYTES NFR BLD AUTO: 0.2 %
INR PPP: 0.88 (ref 0.85–1.13)
LYMPHOCYTES ABSOLUTE: 1.7 THOU/MM3 (ref 1–4.8)
LYMPHOCYTES NFR BLD AUTO: 20.4 %
MCH RBC QN AUTO: 29.6 PG (ref 26–33)
MCHC RBC AUTO-ENTMCNC: 33.5 GM/DL (ref 32.2–35.5)
MCV RBC AUTO: 88.3 FL (ref 81–99)
MONOCYTES ABSOLUTE: 0.5 THOU/MM3 (ref 0.4–1.3)
MONOCYTES NFR BLD AUTO: 6.5 %
NEUTROPHILS NFR BLD AUTO: 70.7 %
NRBC BLD AUTO-RTO: 0 /100 WBC
OSMOLALITY SERPL CALC.SUM OF ELEC: 280.3 MOSMOL/KG (ref 275–300)
PLATELET # BLD AUTO: 223 THOU/MM3 (ref 130–400)
PMV BLD AUTO: 10.4 FL (ref 9.4–12.4)
POTASSIUM SERPL-SCNC: 4.2 MEQ/L (ref 3.5–5.2)
PROCALCITONIN SERPL IA-MCNC: < 0.02 NG/ML (ref 0.01–0.09)
PROT SERPL-MCNC: 6.8 G/DL (ref 6.1–8)
RBC # BLD AUTO: 5.23 MILL/MM3 (ref 4.2–5.4)
SARS-COV-2 RNA RESP QL NAA+PROBE: NOT DETECTED
SEGMENTED NEUTROPHILS ABSOLUTE COUNT: 5.9 THOU/MM3 (ref 1.8–7.7)
SODIUM SERPL-SCNC: 139 MEQ/L (ref 135–145)
TROPONIN, HIGH SENSITIVITY: 8 NG/L (ref 0–12)
TSH SERPL DL<=0.005 MIU/L-ACNC: 1.87 UIU/ML (ref 0.4–4.2)
WBC # BLD AUTO: 8.3 THOU/MM3 (ref 4.8–10.8)

## 2023-10-20 PROCEDURE — 93306 TTE W/DOPPLER COMPLETE: CPT

## 2023-10-20 PROCEDURE — 6360000002 HC RX W HCPCS: Performed by: STUDENT IN AN ORGANIZED HEALTH CARE EDUCATION/TRAINING PROGRAM

## 2023-10-20 PROCEDURE — G0378 HOSPITAL OBSERVATION PER HR: HCPCS

## 2023-10-20 PROCEDURE — 6370000000 HC RX 637 (ALT 250 FOR IP): Performed by: STUDENT IN AN ORGANIZED HEALTH CARE EDUCATION/TRAINING PROGRAM

## 2023-10-20 PROCEDURE — 80076 HEPATIC FUNCTION PANEL: CPT

## 2023-10-20 PROCEDURE — 84145 PROCALCITONIN (PCT): CPT

## 2023-10-20 PROCEDURE — 82550 ASSAY OF CK (CPK): CPT

## 2023-10-20 PROCEDURE — 93005 ELECTROCARDIOGRAM TRACING: CPT | Performed by: STUDENT IN AN ORGANIZED HEALTH CARE EDUCATION/TRAINING PROGRAM

## 2023-10-20 PROCEDURE — 36415 COLL VENOUS BLD VENIPUNCTURE: CPT

## 2023-10-20 PROCEDURE — 93922 UPR/L XTREMITY ART 2 LEVELS: CPT

## 2023-10-20 PROCEDURE — 99285 EMERGENCY DEPT VISIT HI MDM: CPT

## 2023-10-20 PROCEDURE — 87636 SARSCOV2 & INF A&B AMP PRB: CPT

## 2023-10-20 PROCEDURE — 85520 HEPARIN ASSAY: CPT

## 2023-10-20 PROCEDURE — 85025 COMPLETE CBC W/AUTO DIFF WBC: CPT

## 2023-10-20 PROCEDURE — 71045 X-RAY EXAM CHEST 1 VIEW: CPT

## 2023-10-20 PROCEDURE — 85730 THROMBOPLASTIN TIME PARTIAL: CPT

## 2023-10-20 PROCEDURE — 6370000000 HC RX 637 (ALT 250 FOR IP): Performed by: PHYSICIAN ASSISTANT

## 2023-10-20 PROCEDURE — 99205 OFFICE O/P NEW HI 60 MIN: CPT | Performed by: INTERNAL MEDICINE

## 2023-10-20 PROCEDURE — 85610 PROTHROMBIN TIME: CPT

## 2023-10-20 PROCEDURE — 2580000003 HC RX 258: Performed by: STUDENT IN AN ORGANIZED HEALTH CARE EDUCATION/TRAINING PROGRAM

## 2023-10-20 PROCEDURE — 93005 ELECTROCARDIOGRAM TRACING: CPT | Performed by: INTERNAL MEDICINE

## 2023-10-20 PROCEDURE — 84443 ASSAY THYROID STIM HORMONE: CPT

## 2023-10-20 PROCEDURE — 80048 BASIC METABOLIC PNL TOTAL CA: CPT

## 2023-10-20 PROCEDURE — 84484 ASSAY OF TROPONIN QUANT: CPT

## 2023-10-20 PROCEDURE — 99222 1ST HOSP IP/OBS MODERATE 55: CPT | Performed by: INTERNAL MEDICINE

## 2023-10-20 RX ORDER — POLYETHYLENE GLYCOL 3350 17 G
2 POWDER IN PACKET (EA) ORAL
Status: DISCONTINUED | OUTPATIENT
Start: 2023-10-20 | End: 2023-10-21 | Stop reason: HOSPADM

## 2023-10-20 RX ORDER — AMINOPHYLLINE DIHYDRATE 25 MG/ML
50 INJECTION, SOLUTION INTRAVENOUS PRN
Status: ACTIVE | OUTPATIENT
Start: 2023-10-20 | End: 2023-10-20

## 2023-10-20 RX ORDER — HEPARIN SODIUM 1000 [USP'U]/ML
60 INJECTION, SOLUTION INTRAVENOUS; SUBCUTANEOUS ONCE
Status: COMPLETED | OUTPATIENT
Start: 2023-10-20 | End: 2023-10-20

## 2023-10-20 RX ORDER — REGADENOSON 0.08 MG/ML
0.4 INJECTION, SOLUTION INTRAVENOUS
Status: DISCONTINUED | OUTPATIENT
Start: 2023-10-20 | End: 2023-10-20

## 2023-10-20 RX ORDER — SODIUM CHLORIDE 9 MG/ML
INJECTION, SOLUTION INTRAVENOUS PRN
Status: DISCONTINUED | OUTPATIENT
Start: 2023-10-20 | End: 2023-10-21 | Stop reason: HOSPADM

## 2023-10-20 RX ORDER — SODIUM CHLORIDE 0.9 % (FLUSH) 0.9 %
5-40 SYRINGE (ML) INJECTION PRN
Status: ACTIVE | OUTPATIENT
Start: 2023-10-20 | End: 2023-10-20

## 2023-10-20 RX ORDER — HEPARIN SODIUM 1000 [USP'U]/ML
60 INJECTION, SOLUTION INTRAVENOUS; SUBCUTANEOUS PRN
Status: DISCONTINUED | OUTPATIENT
Start: 2023-10-20 | End: 2023-10-21

## 2023-10-20 RX ORDER — NITROGLYCERIN 0.4 MG/1
0.4 TABLET SUBLINGUAL EVERY 5 MIN PRN
Status: ACTIVE | OUTPATIENT
Start: 2023-10-20 | End: 2023-10-20

## 2023-10-20 RX ORDER — HEPARIN SODIUM 1000 [USP'U]/ML
30 INJECTION, SOLUTION INTRAVENOUS; SUBCUTANEOUS PRN
Status: DISCONTINUED | OUTPATIENT
Start: 2023-10-20 | End: 2023-10-21

## 2023-10-20 RX ORDER — ONDANSETRON 4 MG/1
4 TABLET, ORALLY DISINTEGRATING ORAL EVERY 8 HOURS PRN
Status: DISCONTINUED | OUTPATIENT
Start: 2023-10-20 | End: 2023-10-21 | Stop reason: HOSPADM

## 2023-10-20 RX ORDER — SODIUM CHLORIDE 0.9 % (FLUSH) 0.9 %
10 SYRINGE (ML) INJECTION PRN
Status: DISCONTINUED | OUTPATIENT
Start: 2023-10-20 | End: 2023-10-21 | Stop reason: HOSPADM

## 2023-10-20 RX ORDER — PRIMIDONE 50 MG/1
50 TABLET ORAL DAILY
Status: DISCONTINUED | OUTPATIENT
Start: 2023-10-20 | End: 2023-10-20 | Stop reason: ALTCHOICE

## 2023-10-20 RX ORDER — HEPARIN SODIUM 10000 [USP'U]/100ML
5-30 INJECTION, SOLUTION INTRAVENOUS CONTINUOUS
Status: DISCONTINUED | OUTPATIENT
Start: 2023-10-20 | End: 2023-10-21

## 2023-10-20 RX ORDER — SODIUM CHLORIDE 9 MG/ML
INJECTION, SOLUTION INTRAVENOUS CONTINUOUS
Status: DISCONTINUED | OUTPATIENT
Start: 2023-10-20 | End: 2023-10-21

## 2023-10-20 RX ORDER — POLYETHYLENE GLYCOL 3350 17 G/17G
17 POWDER, FOR SOLUTION ORAL DAILY PRN
Status: DISCONTINUED | OUTPATIENT
Start: 2023-10-20 | End: 2023-10-21 | Stop reason: HOSPADM

## 2023-10-20 RX ORDER — PANTOPRAZOLE SODIUM 40 MG/1
40 TABLET, DELAYED RELEASE ORAL
Status: DISCONTINUED | OUTPATIENT
Start: 2023-10-20 | End: 2023-10-21 | Stop reason: HOSPADM

## 2023-10-20 RX ORDER — ASPIRIN 81 MG/1
324 TABLET, CHEWABLE ORAL ONCE
Status: COMPLETED | OUTPATIENT
Start: 2023-10-20 | End: 2023-10-20

## 2023-10-20 RX ORDER — SODIUM CHLORIDE 9 MG/ML
500 INJECTION, SOLUTION INTRAVENOUS CONTINUOUS PRN
Status: ACTIVE | OUTPATIENT
Start: 2023-10-20 | End: 2023-10-20

## 2023-10-20 RX ORDER — ALPRAZOLAM 0.25 MG/1
0.25 TABLET ORAL EVERY 6 HOURS PRN
Status: DISCONTINUED | OUTPATIENT
Start: 2023-10-20 | End: 2023-10-21 | Stop reason: HOSPADM

## 2023-10-20 RX ORDER — ASPIRIN 81 MG/1
81 TABLET ORAL DAILY
Status: DISCONTINUED | OUTPATIENT
Start: 2023-10-21 | End: 2023-10-21 | Stop reason: HOSPADM

## 2023-10-20 RX ORDER — ENOXAPARIN SODIUM 100 MG/ML
30 INJECTION SUBCUTANEOUS EVERY 24 HOURS
Status: DISCONTINUED | OUTPATIENT
Start: 2023-10-21 | End: 2023-10-20

## 2023-10-20 RX ORDER — METOPROLOL TARTRATE 5 MG/5ML
5 INJECTION INTRAVENOUS EVERY 5 MIN PRN
Status: ACTIVE | OUTPATIENT
Start: 2023-10-20 | End: 2023-10-20

## 2023-10-20 RX ORDER — ONDANSETRON 2 MG/ML
4 INJECTION INTRAMUSCULAR; INTRAVENOUS EVERY 6 HOURS PRN
Status: DISCONTINUED | OUTPATIENT
Start: 2023-10-20 | End: 2023-10-21 | Stop reason: HOSPADM

## 2023-10-20 RX ORDER — ALBUTEROL SULFATE 90 UG/1
2 AEROSOL, METERED RESPIRATORY (INHALATION) PRN
Status: ACTIVE | OUTPATIENT
Start: 2023-10-20 | End: 2023-10-20

## 2023-10-20 RX ORDER — SODIUM CHLORIDE 0.9 % (FLUSH) 0.9 %
5-40 SYRINGE (ML) INJECTION EVERY 12 HOURS SCHEDULED
Status: DISCONTINUED | OUTPATIENT
Start: 2023-10-20 | End: 2023-10-21 | Stop reason: HOSPADM

## 2023-10-20 RX ORDER — ATROPINE SULFATE 0.1 MG/ML
0.5 INJECTION INTRAVENOUS EVERY 5 MIN PRN
Status: ACTIVE | OUTPATIENT
Start: 2023-10-20 | End: 2023-10-20

## 2023-10-20 RX ADMIN — HEPARIN SODIUM AND DEXTROSE 12 UNITS/KG/HR: 10000; 5 INJECTION INTRAVENOUS at 16:41

## 2023-10-20 RX ADMIN — ALPRAZOLAM 0.25 MG: 0.25 TABLET ORAL at 14:28

## 2023-10-20 RX ADMIN — SODIUM CHLORIDE: 9 INJECTION, SOLUTION INTRAVENOUS at 12:36

## 2023-10-20 RX ADMIN — ASPIRIN 81 MG CHEWABLE TABLET 324 MG: 81 TABLET CHEWABLE at 12:33

## 2023-10-20 RX ADMIN — HEPARIN SODIUM 2720 UNITS: 1000 INJECTION INTRAVENOUS; SUBCUTANEOUS at 16:38

## 2023-10-20 RX ADMIN — ALUMINUM HYDROXIDE, MAGNESIUM HYDROXIDE, AND SIMETHICONE: 200; 200; 20 SUSPENSION ORAL at 14:28

## 2023-10-20 RX ADMIN — PANTOPRAZOLE SODIUM 40 MG: 40 TABLET, DELAYED RELEASE ORAL at 12:33

## 2023-10-20 RX ADMIN — SODIUM CHLORIDE, PRESERVATIVE FREE 10 ML: 5 INJECTION INTRAVENOUS at 12:33

## 2023-10-20 RX ADMIN — ALPRAZOLAM 0.25 MG: 0.25 TABLET ORAL at 22:37

## 2023-10-20 ASSESSMENT — PAIN - FUNCTIONAL ASSESSMENT
PAIN_FUNCTIONAL_ASSESSMENT: 0-10
PAIN_FUNCTIONAL_ASSESSMENT: NONE - DENIES PAIN

## 2023-10-20 ASSESSMENT — PAIN DESCRIPTION - LOCATION: LOCATION: CHEST

## 2023-10-20 ASSESSMENT — PAIN SCALES - GENERAL: PAINLEVEL_OUTOF10: 2

## 2023-10-20 ASSESSMENT — ENCOUNTER SYMPTOMS
RHINORRHEA: 0
ABDOMINAL PAIN: 0
SINUS PRESSURE: 1
COUGH: 0
SHORTNESS OF BREATH: 1
NAUSEA: 1
SORE THROAT: 0
DIARRHEA: 0
VOMITING: 0

## 2023-10-20 ASSESSMENT — HEART SCORE: ECG: 0

## 2023-10-20 NOTE — ED NOTES
Pt resting in bed at this time, speaking to hospitalist. Awaiting orders from provider for medication for anxiety per request.      Armin Dominguez RN  10/20/23 4552

## 2023-10-20 NOTE — H&P
Internal Medicine Resident History and Physical          Name: Cristal Charles, female, : 1960, MRN: [id number]    PCP: Chris Perkins MD    Date of Admission: 10/20/2023  Date of Service: Pt seen/examined on 10/20/23  and Admitted to Observation with expected LOS less than two midnights due to medical therapy. Assessment and Plan:    Unstable angina: Substernal chest pain occurring intermittently over the past few years associated with nausea, diaphoresis, and dizziness. Also endorses orthopnea and PND. Extensive cardiac history. Last C in  showed LAD 50-60% stenosed. Patient cannot tolerate Lexiscan or treadmill stress test.  Delta troponin negative. Reports noncompliance with all medications. Cardiology consulted and planning for [facility] tomorrow a.m.  N.p.o. after midnight. Continue heparin GTT, beta-blocker, aspirin. Complete echo ordered. Repeat troponin and EKG if chest pain recurs. Bilateral lower extremity claudication: Reports bilateral calf pain worse with walking. No ulcers or wounds noted and no alarming physical findings. Check ABIs. Recommend structured exercise therapy. GERD: Continue Protonix. Anxiety: Xanax as needed. Smoker: Nicotine lozenges. Suspected sleep apnea: Patient reports apneic episodes and loud snoring at night. If she becomes hypoxic or bradycardic overnight consider CPAP trial.  Refer for outpatient sleep study at discharge.   History of BPPV: Possibly contributing to ongoing dizziness.     =======================================================================  Chief Complaint: Chest pain    History Of Present Illness:  Cristal Charles is a 58 y.o. female with past medical history described below who presents to [facility] with patient for reports substernal chest pain that radiates to her shoulders that occurs intermittently mostly with exertion and sometimes at rest.  Reports this pain has been ongoing grossly intact. Neck: Supple, with full range of motion. Trachea midline. No gross JVD appreciated. Respiratory:  Normal respiratory effort. Clear to auscultation, bilaterally without rales or wheezes or rhonchi. Cardiovascular: Normal rate, regular rhythm with normal S1/S2 without murmurs. No lower extremity edema. Abdomen: Soft, non-tender, non-distended with normal bowel sounds. Musculoskeletal: No joint swelling or tenderness. Normal tone. No abnormal movements. Skin: Warm and dry. No rashes or lesions. Neurologic:  No focal sensory/motor deficits in the upper and lower extremities. Cranial nerves:  grossly non-focal 2-12. Psychiatric: Alert and oriented, normal insight and thought content. Capillary Refill: Brisk,< 3 seconds. Peripheral Pulses: +2 palpable, equal bilaterally. Labs:     Recent Labs     10/20/23  0700   WBC 8.3   HGB 15.5   HCT 46.2        Recent Labs     10/20/23  0700      K 4.2      CO2 22*   BUN 19   CREATININE 0.8   CALCIUM 9.4     Recent Labs     10/20/23  0700   AST 17   ALT 13   BILIDIR <0.2   BILITOT 0.4   ALKPHOS 91     No results for input(s): \"INR\" in the last 72 hours. No results for input(s): \"TROPONINT\" in the last 72 hours. Recent Labs     10/20/23  0700   PROCAL < 0.02      Lab Results   Component Value Date/Time    NITRU NEGATIVE 04/15/2019 03:00 AM    WBCUA 15-25 04/15/2019 03:00 AM    BACTERIA NONE 04/15/2019 03:00 AM    RBCUA 3-5 04/15/2019 03:00 AM    BLOODU NEGATIVE 04/15/2019 03:00 AM    SPECGRAV 1.014 04/15/2019 03:00 AM    GLUCOSEU NEGATIVE 06/20/2015 04:25 PM       Radiology:   VL BARBIE BILATERAL LIMITED 1-2 LEVELS   Final Result   Normal study. **This report has been created using voice recognition software. It may contain minor errors which are inherent in voice recognition technology. **      Final report electronically signed by Dr. Orlando Cedillo on 10/20/2023 3:22 PM      XR CHEST PORTABLE   Final Result

## 2023-10-20 NOTE — PROGRESS NOTES
Reviewed patient home meds and verified that patient is not taking most meds as she has no insurance, ran out of prescription, and has not been able to afford out of pocket.

## 2023-10-20 NOTE — ED NOTES
Pt transported to [facility]. Floor contacted prior to transport, spoke to [place].       David Smith  10/20/23 1526

## 2023-10-20 NOTE — CARE COORDINATION
Spoke with patient and son who advised does not have ACP documents at home, declined need to complete this admission. Educated on process if does not have. Verbalized understanding and is okay with son and daughter making decisions. Denied concerns or needs.

## 2023-10-20 NOTE — ED NOTES
Patient refused metoprolol d/t not wanting to make her blood pressure drop for her cath in the morning. Pt off to vascular lab.       Lita Wood  10/20/23 1724

## 2023-10-20 NOTE — CONSULTS
mouth daily      ipratropium-albuterol (DUONEB) 0.5-2.5 (3) MG/3ML SOLN nebulizer solution Take 3 mLs by nebulization 2 times daily for 7 days 42 mL 0    ibuprofen (ADVIL;MOTRIN) 600 MG tablet Take 1 tablet by mouth 3 times daily (with meals) 30 tablet 0    nitroGLYCERIN (NITROSTAT) 0.4 MG SL tablet Place 1 tablet under the tongue every 5 minutes as needed for Chest pain 25 tablet 0    aspirin EC 81 MG EC tablet Take 1 tablet by mouth daily. 30 tablet 3    ALPRAZolam (XANAX) 0.25 MG tablet Take 0.25 mg by mouth every 6 hours as needed for Anxiety. Patient normally takes 1 tab at night and 1 in the morning if needed       Prior to Admission medications    Medication Sig Start Date End Date Taking? Authorizing Provider   naproxen (NAPROSYN) 500 MG tablet Take 1 tablet by mouth 2 times daily as needed for Pain 8/9/22 8/16/22  Jack Robison MD   ondansetron [facility]) 4 MG tablet Take 1 tablet by mouth 3 times daily as needed for Nausea or Vomiting 8/9/22   Jack Robison MD   primidone (MYSOLINE) 50 MG tablet Take 1 tablet by mouth daily 4/17/19   Joy Soto MD   omeprazole (PRILOSEC) 10 MG delayed release capsule Take 10 mg by mouth daily    ProviderLuiz MD   ipratropium-albuterol (DUONEB) 0.5-2.5 (3) MG/3ML SOLN nebulizer solution Take 3 mLs by nebulization 2 times daily for 7 days 12/1/18 12/8/18  SANDOR Perez - CNP   ibuprofen (ADVIL;MOTRIN) 600 MG tablet Take 1 tablet by mouth 3 times daily (with meals) 3/29/18   Olivia Martinez MD   nitroGLYCERIN (NITROSTAT) 0.4 MG SL tablet Place 1 tablet under the tongue every 5 minutes as needed for Chest pain 10/27/17   Milton Jeter MD   aspirin EC 81 MG EC tablet Take 1 tablet by mouth daily. 3/13/15   Nohemy Vera MD   ALPRAZolam Vernia Tamar) 0.25 MG tablet Take 0.25 mg by mouth every 6 hours as needed for Anxiety.  Patient normally takes 1 tab at night and 1 in the morning if needed    Provider, MD Luiz   Scheduled Dr. Mary Mistry, cardiology but not actively following. HTN: per chart review. No home antihypertensives. Monitor. HLD: per chart review. No statins. Check lipid panel. Electronically signed by Guillaume Nye DO on 10/20/2023 at 11:29 AM    Attending Supervising Physician's [address] Statement  I performed a history and physical examination on the patient and discussed the management with the resident physician. I reviewed and agree with the findings and plan as documented in the resident's note except for as noted below. Multiple cardiac risk factors, moderate LAD disease FFR negative previously in 2015, non-compliant, ECG without acute changes, treat as [place], Heparin gtt, TTE, GDMT for ACS. Has hx of BPPV-associated dizziness. She ate today. NPO after midnight, cath in the AM.  Risks, benefits, alternatives of the procedure discussed, given the above comorbidities, the risk of the procedure is higher than average, but that this time the benefit outweighs this risk. Thus, a decision was made to proceed with cardiac catheterization with possible PCI as it it the recommended procedure for the patient. The indication, risks and benefits of the procedure and possible therapeutic consequences and alternatives were discussed with the patient. The patient was given the opportunity to ask questions and to have them answered to his/her satisfaction. Risks of the procedure include but are not limited to the following: Bleeding, hematoma including retroperitoneal hematoma, infection, pain and discomfort, injury to the aorta and other blood vessels, rhythm disturbance, low blood pressure, myocardial infarction, need for bypass surgery, stroke, kidney damage/failure, myocardial perforation, allergic reactions to sedatives/contrast material, loss of pulse/vascular compromise requiring surgery, aneurysm/pseudoaneurysm formation, possible loss of a limb/hand/leg, death.  Alternatives to and omission of the suggested procedure were

## 2023-10-20 NOTE — ED PROVIDER NOTES
[facility] EMERGENCY DEPT      Pt Name: Ga Viramontes  MRN: [id number]  [address] [facility] 1960  Date of evaluation: 10/20/2023  Provider: Rakel Jimenez PA-C    CHIEF COMPLAINT       Chief Complaint   Patient presents with    Palpitations    Shortness of Breath       Nurses Notes reviewed and I agree except as noted in the HPI. HISTORY OF PRESENT ILLNESS    Ga Viramontes is a 58 y.o. female who presents from work by private vehicle for not feeling well. Patient has not felt well for the last 4 days. Patient has intermittently felt her heart pounding, nausea, dyspnea on exertion, dry mouth, \"dizzy spells,\" and last night chest pain. The chest pain was located beneath her left breast and in her right shoulder blade. The pain was intermittent. Along with the chest pain patient has had headaches that also are described as intermittent and dull. Dizzy spells were described as feeling faint. Patient's calves have been weak and achy. Today she was at work and that ultimately is what brought her to the ER. She did not feel like she could do her job. Patient denies fever, chills, URI symptoms, UTI symptoms, diaphoresis, vomiting, change in appetite, or other complaints. There has been no new medications. Cardiac cath 3/11/2015  CONCLUSION:  Nonobstructive disease in the LAD. The LAD has around 50-60  percent disease in the mid segment which was not significant physiologically  by FFR. Continue optimal medical management with aspirin, statin and beta  blocker. Further care as per Dr. Adore Nevarez. Advised to quit smoking    REVIEW OF SYSTEMS     Review of Systems   Constitutional:  Negative for appetite change, chills, diaphoresis and fever. HENT:  Positive for sinus pressure. Negative for congestion, rhinorrhea and sore throat. Eyes:  Negative for visual disturbance. Respiratory:  Positive for shortness of breath. Negative for cough. Cardiovascular:  Positive for chest pain and palpitations.

## 2023-10-20 NOTE — ED NOTES
Patient back in room from testing. Pt denies any needs. Call light within reach.       Lita Garcia  10/20/23 5445

## 2023-10-20 NOTE — ED NOTES
Presents to ED with complaints of ongoing intermittent palpitations. Pt states she has also noted shortness of breath the last 4 days. Pt reports she has cardiac history, but has not seen her cardiologist in years. EKG completed.      Monika, [address] Viviana MCCALL, RN  10/20/23 9190

## 2023-10-20 NOTE — ED NOTES
Patient resting in bed. Respirations easy and unlabored. No distress noted. Call light within reach.      Emilia Frank RN  10/20/23 3986

## 2023-10-20 NOTE — ED NOTES
Upon initial contact with the patient, pt resting in bed, pt denies any needs. Call light within reach.       Kaylin Door, [address]  10/20/23 9201

## 2023-10-21 ENCOUNTER — APPOINTMENT (OUTPATIENT)
Dept: CARDIAC CATH/INVASIVE PROCEDURES | Age: 63
End: 2023-10-21

## 2023-10-21 VITALS
HEART RATE: 55 BPM | OXYGEN SATURATION: 99 % | DIASTOLIC BLOOD PRESSURE: 66 MMHG | BODY MASS INDEX: 20.16 KG/M2 | HEIGHT: 59 IN | RESPIRATION RATE: 17 BRPM | WEIGHT: 100 LBS | TEMPERATURE: 97.5 F | SYSTOLIC BLOOD PRESSURE: 117 MMHG

## 2023-10-21 LAB
ACTIVATED CLOTTING TIME: 281 SECONDS (ref 1–150)
ANION GAP SERPL CALC-SCNC: 9 MEQ/L (ref 8–16)
BUN SERPL-MCNC: 18 MG/DL (ref 7–22)
CALCIUM SERPL-MCNC: 8.4 MG/DL (ref 8.5–10.5)
CHLORIDE SERPL-SCNC: 110 MEQ/L (ref 98–111)
CO2 SERPL-SCNC: 22 MEQ/L (ref 23–33)
CREAT SERPL-MCNC: 0.7 MG/DL (ref 0.4–1.2)
DEPRECATED RDW RBC AUTO: 46.3 FL (ref 35–45)
EKG ATRIAL RATE: 53 BPM
EKG ATRIAL RATE: 68 BPM
EKG P AXIS: 75 DEGREES
EKG P AXIS: 79 DEGREES
EKG P-R INTERVAL: 114 MS
EKG P-R INTERVAL: 130 MS
EKG Q-T INTERVAL: 394 MS
EKG Q-T INTERVAL: 436 MS
EKG QRS DURATION: 82 MS
EKG QRS DURATION: 90 MS
EKG QTC CALCULATION (BAZETT): 409 MS
EKG QTC CALCULATION (BAZETT): 418 MS
EKG R AXIS: 66 DEGREES
EKG R AXIS: 74 DEGREES
EKG T AXIS: 63 DEGREES
EKG T AXIS: 67 DEGREES
EKG VENTRICULAR RATE: 53 BPM
EKG VENTRICULAR RATE: 68 BPM
ERYTHROCYTE [DISTWIDTH] IN BLOOD BY AUTOMATED COUNT: 14.3 % (ref 11.5–14.5)
GFR SERPL CREATININE-BSD FRML MDRD: > 60 ML/MIN/1.73M2
GLUCOSE SERPL-MCNC: 105 MG/DL (ref 70–108)
HCT VFR BLD AUTO: 39.9 % (ref 37–47)
HEPARIN UNFRACTIONATED: 0.27 U/ML (ref 0.3–0.7)
HEPARIN UNFRACTIONATED: 1.5 U/ML (ref 0.3–0.7)
HGB BLD-MCNC: 13.3 GM/DL (ref 12–16)
MAGNESIUM SERPL-MCNC: 2.1 MG/DL (ref 1.6–2.4)
MCH RBC QN AUTO: 29.5 PG (ref 26–33)
MCHC RBC AUTO-ENTMCNC: 33.3 GM/DL (ref 32.2–35.5)
MCV RBC AUTO: 88.5 FL (ref 81–99)
PLATELET # BLD AUTO: 190 THOU/MM3 (ref 130–400)
PMV BLD AUTO: 10.4 FL (ref 9.4–12.4)
POTASSIUM SERPL-SCNC: 4 MEQ/L (ref 3.5–5.2)
RBC # BLD AUTO: 4.51 MILL/MM3 (ref 4.2–5.4)
SODIUM SERPL-SCNC: 141 MEQ/L (ref 135–145)
WBC # BLD AUTO: 6.4 THOU/MM3 (ref 4.8–10.8)

## 2023-10-21 PROCEDURE — 85347 COAGULATION TIME ACTIVATED: CPT

## 2023-10-21 PROCEDURE — 93571 IV DOP VEL&/PRESS C FLO 1ST: CPT | Performed by: INTERNAL MEDICINE

## 2023-10-21 PROCEDURE — 2500000003 HC RX 250 WO HCPCS

## 2023-10-21 PROCEDURE — 93458 L HRT ARTERY/VENTRICLE ANGIO: CPT

## 2023-10-21 PROCEDURE — 85027 COMPLETE CBC AUTOMATED: CPT

## 2023-10-21 PROCEDURE — 6370000000 HC RX 637 (ALT 250 FOR IP): Performed by: STUDENT IN AN ORGANIZED HEALTH CARE EDUCATION/TRAINING PROGRAM

## 2023-10-21 PROCEDURE — 85520 HEPARIN ASSAY: CPT

## 2023-10-21 PROCEDURE — C1887 CATHETER, GUIDING: HCPCS

## 2023-10-21 PROCEDURE — 6360000004 HC RX CONTRAST MEDICATION: Performed by: INTERNAL MEDICINE

## 2023-10-21 PROCEDURE — 93458 L HRT ARTERY/VENTRICLE ANGIO: CPT | Performed by: INTERNAL MEDICINE

## 2023-10-21 PROCEDURE — 93010 ELECTROCARDIOGRAM REPORT: CPT | Performed by: INTERNAL MEDICINE

## 2023-10-21 PROCEDURE — 99239 HOSP IP/OBS DSCHRG MGMT >30: CPT | Performed by: STUDENT IN AN ORGANIZED HEALTH CARE EDUCATION/TRAINING PROGRAM

## 2023-10-21 PROCEDURE — 83735 ASSAY OF MAGNESIUM: CPT

## 2023-10-21 PROCEDURE — 93571 IV DOP VEL&/PRESS C FLO 1ST: CPT

## 2023-10-21 PROCEDURE — G0378 HOSPITAL OBSERVATION PER HR: HCPCS

## 2023-10-21 PROCEDURE — C1769 GUIDE WIRE: HCPCS

## 2023-10-21 PROCEDURE — 36415 COLL VENOUS BLD VENIPUNCTURE: CPT

## 2023-10-21 PROCEDURE — 2580000003 HC RX 258: Performed by: STUDENT IN AN ORGANIZED HEALTH CARE EDUCATION/TRAINING PROGRAM

## 2023-10-21 PROCEDURE — 6360000002 HC RX W HCPCS

## 2023-10-21 PROCEDURE — 80048 BASIC METABOLIC PNL TOTAL CA: CPT

## 2023-10-21 PROCEDURE — C1894 INTRO/SHEATH, NON-LASER: HCPCS

## 2023-10-21 PROCEDURE — C1760 CLOSURE DEV, VASC: HCPCS

## 2023-10-21 PROCEDURE — 6360000002 HC RX W HCPCS: Performed by: STUDENT IN AN ORGANIZED HEALTH CARE EDUCATION/TRAINING PROGRAM

## 2023-10-21 RX ORDER — SODIUM CHLORIDE 0.9 % (FLUSH) 0.9 %
5-40 SYRINGE (ML) INJECTION PRN
Status: DISCONTINUED | OUTPATIENT
Start: 2023-10-21 | End: 2023-10-21 | Stop reason: HOSPADM

## 2023-10-21 RX ORDER — SODIUM CHLORIDE 9 MG/ML
INJECTION, SOLUTION INTRAVENOUS CONTINUOUS
Status: DISCONTINUED | OUTPATIENT
Start: 2023-10-21 | End: 2023-10-21 | Stop reason: HOSPADM

## 2023-10-21 RX ORDER — SODIUM CHLORIDE 0.9 % (FLUSH) 0.9 %
5-40 SYRINGE (ML) INJECTION EVERY 12 HOURS SCHEDULED
Status: DISCONTINUED | OUTPATIENT
Start: 2023-10-21 | End: 2023-10-21 | Stop reason: HOSPADM

## 2023-10-21 RX ORDER — SODIUM CHLORIDE 9 MG/ML
INJECTION, SOLUTION INTRAVENOUS PRN
Status: DISCONTINUED | OUTPATIENT
Start: 2023-10-21 | End: 2023-10-21 | Stop reason: HOSPADM

## 2023-10-21 RX ORDER — ACETAMINOPHEN 325 MG/1
650 TABLET ORAL EVERY 4 HOURS PRN
Status: DISCONTINUED | OUTPATIENT
Start: 2023-10-21 | End: 2023-10-21 | Stop reason: HOSPADM

## 2023-10-21 RX ADMIN — SODIUM CHLORIDE: 9 INJECTION, SOLUTION INTRAVENOUS at 05:45

## 2023-10-21 RX ADMIN — HEPARIN SODIUM 1360 UNITS: 1000 INJECTION INTRAVENOUS; SUBCUTANEOUS at 05:42

## 2023-10-21 RX ADMIN — IOPAMIDOL 100 ML: 755 INJECTION, SOLUTION INTRAVENOUS at 08:41

## 2023-10-21 RX ADMIN — METOPROLOL TARTRATE 25 MG: 25 TABLET, FILM COATED ORAL at 11:12

## 2023-10-21 RX ADMIN — ONDANSETRON 4 MG: 4 TABLET, ORALLY DISINTEGRATING ORAL at 03:25

## 2023-10-21 RX ADMIN — HEPARIN SODIUM 1360 UNITS: 1000 INJECTION INTRAVENOUS; SUBCUTANEOUS at 00:06

## 2023-10-21 ASSESSMENT — PAIN DESCRIPTION - LOCATION: LOCATION: ARM

## 2023-10-21 ASSESSMENT — PAIN SCALES - GENERAL
PAINLEVEL_OUTOF10: 2
PAINLEVEL_OUTOF10: 7

## 2023-10-21 NOTE — DISCHARGE INSTRUCTIONS
Follow up with PCP and cardiology. Discharge Instructions for Radial Heart Catherization    1. Take it easy for 3-4 days. 2.  No driving for 2 days. 3.  No lifting of 5 lbs or more for 5 days with the affected arm. 4.  Can shower after 24 hours. 5.  Remove arm board after 24 hours. 6.  Apply a band aid to the insertion site daily for 5 days. May apply antibiotic ointment if desired, but not necessary. Wash site daily with soap and water. 7.  No creams, ointments, or powders near the insertion site. 8.  No tub baths, swimming, hot tubs, or hand washing dishes for 1 week. 9.  Watch for signs of infection (redness, warmth, swelling, or pus drainage) or coolness of extremity and call physician if this occurs  10. If bleeding occurs from insertion site, apply pressure and call 911.

## 2023-10-21 NOTE — FLOWSHEET NOTE
10/21/23 1132   Safe Environment   Safety Measures Bed in low position;Call light within reach; Family at bedside;Standard Safety Measures; Fall prevention (comment)     Call placed to patients room, patient responds to audio, permitted video. Patient alert and oriented. Patient denied any voiced conscerns/complaints at this time. Patient educated on utilizig call light. Call light within reach, no signs or symtpoms of distress noted.

## 2023-10-21 NOTE — PLAN OF CARE
Problem: Discharge Planning  Goal: Discharge to home or other facility with appropriate resources  Outcome: Progressing  Flowsheets (Taken 10/20/2023 2337)  Discharge to home or other facility with appropriate resources:   Identify barriers to discharge with patient and caregiver   Arrange for needed discharge resources and transportation as appropriate   Identify discharge learning needs (meds, wound care, etc)   Arrange for interpreters to assist at discharge as needed   Refer to discharge planning if patient needs post-hospital services based on physician order or complex needs related to functional status, cognitive ability or social support system     Problem: Safety - Adult  Goal: Free from fall injury  Outcome: Progressing  Flowsheets (Taken 10/20/2023 2337)  Free From Fall Injury:   Instruct family/caregiver on patient safety   Based on caregiver fall risk screen, instruct family/caregiver to ask for assistance with transferring infant if caregiver noted to have fall risk factors     Problem: Cardiovascular - Adult  Goal: Maintains optimal cardiac output and hemodynamic stability  Outcome: Progressing  Flowsheets (Taken 10/20/2023 2337)  Maintains optimal cardiac output and hemodynamic stability:   Monitor blood pressure and heart rate   Monitor urine output and notify Licensed Independent Practitioner for values outside of normal range   Assess for signs of decreased cardiac output   Administer fluid and/or volume expanders as ordered   Administer vasoactive medications as ordered   For PPHN infants, administer sedation as ordered and minimize all controllable stressors.   Goal: Absence of cardiac dysrhythmias or at baseline  Outcome: Progressing  Flowsheets (Taken 10/20/2023 2337)  Absence of cardiac dysrhythmias or at baseline:   Monitor cardiac rate and rhythm   Administer antiarrhythmia medication and electrolyte replacement as ordered   Assess for signs of decreased cardiac output     Problem: Skin/Tissue Integrity - Adult  Goal: Skin integrity remains intact  Outcome: Progressing  Flowsheets  Taken 10/20/2023 2337  Skin Integrity Remains Intact:   Monitor for areas of redness and/or skin breakdown   Assess vascular access sites hourly  Taken 10/20/2023 2334  Skin Integrity Remains Intact:   Monitor for areas of redness and/or skin breakdown   Assess vascular access sites hourly     Problem: Infection - Adult  Goal: Absence of infection during hospitalization  Outcome: Progressing  Flowsheets (Taken 10/20/2023 2337)  Absence of infection during hospitalization:   Assess and monitor for signs and symptoms of infection   Monitor all insertion sites i.e., indwelling lines, tubes and drains   Monitor lab/diagnostic results   Monitor endotracheal (as able) and nasal secretions for changes in amount and color   Administer medications as ordered   Christmas appropriate cooling/warming therapies per order   Instruct and encourage patient and family to use good hand hygiene technique   Identify and instruct in appropriate isolation precautions for identified infection/condition     Problem: Chronic Conditions and Co-morbidities  Goal: Patient's chronic conditions and co-morbidity symptoms are monitored and maintained or improved  Outcome: Progressing  Flowsheets (Taken 10/20/2023 2337)  Care Plan - Patient's Chronic Conditions and Co-Morbidity Symptoms are Monitored and Maintained or Improved:   Monitor and assess patient's chronic conditions and comorbid symptoms for stability, deterioration, or improvement   Collaborate with multidisciplinary team to address chronic and comorbid conditions and prevent exacerbation or deterioration   Update acute care plan with appropriate goals if chronic or comorbid symptoms are exacerbated and prevent overall improvement and discharge     Problem: Pain  Goal: Verbalizes/displays adequate comfort level or baseline comfort level  Outcome: Progressing  Flowsheets (Taken

## 2023-10-21 NOTE — PROCEDURES
[place] [address]                            CARDIAC CATHETERIZATION    PATIENT NAME: Jose J Butler                     :        1960  MED REC NO:   [id number]                           ROOM:       0004  ACCOUNT NO:   [de-identified]                           ADMIT DATE: 10/20/2023  PROVIDER:     Tashi Yang MD    DATE OF PROCEDURE:  10/21/2023    PROCEDURES PERFORMED:  Coronary angiogram, LV gram, FFR of mid LAD. INDICATION FOR STUDY:  Recurrent chest pain. DESCRIPTION OF PROCEDURE:  After written informed consent was obtained,  the patient was brought to the cardiac catheterization laboratory in a  fasting, nonsedated state. Pre-procedure time-out was performed. 2%  lidocaine was used to anesthetize the subcutaneous tissue initially  overlying the right radial artery. Due to small caliber vessel and then  it could not accommodate the sheath, we diverted our access site to the  right femoral artery. Under ultrasound and fluoroscopic guidance, a  5-Khmer sheath was placed in the right common femoral artery. Once the  sheath was in place, a femoral angiogram was performed which showed good  common femoral artery stick. ESTIMATED BLOOD LOSS:  During this procedure was less than 30 mL. MEDICATIONS:  Please see MAR. CORONARY ANATOMY:  1. Right coronary artery is a dominant vessel. There is a 40% to 50%  mid segment stenosis followed by overall patent vessel. 2.  The left main is patent, gives rise to LAD and left circumflex  artery. 3.  Left circumflex artery is patent with mild luminal irregularities  seen in the proximal, mid and distal portions of the vessel. 4. LAD had a mildly calcified and diffuse long segment disease of 50%  in the mid segment followed by mild luminal irregularities. LVEDP was measured to be 17 mmHg.   There was no significant gradient  across the aortic valve and LV systolic

## 2023-10-21 NOTE — PROGRESS NOTES
This RN discussed discharge instructions with the patient and family according to hospital policy. Patient and family verbalized understanding. An opportunity was provided for questions. The bedside RN provided the patient with a hardcopy of the discharge instructions.

## 2023-10-21 NOTE — FLOWSHEET NOTE
10/21/23 1313   Safe Environment   Safety Measures Bed in low position; Family at bedside;Nurse at bedside;Call light within reach;Gripper socks;Standard Safety Measures     Call placed to patients room, patient responds to audio, permitted video. Patient alert and oriented. Patient denied any voiced concerns/complaints at this time. Patient educated on utilizig call light. Call light within reach, no signs or symtpoms of distress noted.

## 2023-10-21 NOTE — OP NOTE
[address]  Sedation/Analgesia Post Sedation Record        Pt Name: Kaleigh Timmons  MRN: [id number]  YOB: 1960  Procedure Performed By: Nica Crook MD MD, Jimmy Garrido  Primary Care Physician: Johnathan Skiff, MD    POST-PROCEDURE    Sedation/Anesthesia:  Local Anesthesia and IV Conscious Sedation with continuous O2 monitoring    Estimated Blood Loss: 10 cc     Specimens Removed:  [x]None []Other:      Disposition of Specimen:  []Pathology []Other        Complications:   [x]None Immediate []Other:       Procedure performed: Left heart cath    Post Procedure Diagnosis/Findings:  Non-obstructive Coronary Artery Disease   FFR of LAD is 0.90       Recommendations:    Medical treatment  Routine post-cath care.                Nica Crook MD MD, Jimmy Garrido  Electronically signed 10/21/2023 at 11:46 AM

## 2023-10-22 ENCOUNTER — APPOINTMENT (OUTPATIENT)
Dept: CT IMAGING | Age: 63
End: 2023-10-22

## 2023-10-22 ENCOUNTER — HOSPITAL ENCOUNTER (EMERGENCY)
Age: 63
Discharge: HOME | End: 2023-10-23
Attending: EMERGENCY MEDICINE

## 2023-10-22 ENCOUNTER — APPOINTMENT (OUTPATIENT)
Dept: GENERAL RADIOLOGY | Age: 63
End: 2023-10-22

## 2023-10-22 VITALS
OXYGEN SATURATION: 99 % | RESPIRATION RATE: 16 BRPM | BODY MASS INDEX: 20.16 KG/M2 | DIASTOLIC BLOOD PRESSURE: 65 MMHG | SYSTOLIC BLOOD PRESSURE: 133 MMHG | HEART RATE: 73 BPM | WEIGHT: 100 LBS | HEIGHT: 59 IN | TEMPERATURE: 98.6 F

## 2023-10-22 DIAGNOSIS — R07.81 PLEURODYNIA: Primary | ICD-10-CM

## 2023-10-22 LAB
ALBUMIN SERPL BCG-MCNC: 3.5 G/DL (ref 3.5–5.1)
ALP SERPL-CCNC: 81 U/L (ref 38–126)
ALT SERPL W/O P-5'-P-CCNC: 16 U/L (ref 11–66)
ANION GAP SERPL CALC-SCNC: 10 MEQ/L (ref 8–16)
AST SERPL-CCNC: 20 U/L (ref 5–40)
BASOPHILS ABSOLUTE: 0 THOU/MM3 (ref 0–0.1)
BASOPHILS NFR BLD AUTO: 0.4 %
BILIRUB CONJ SERPL-MCNC: < 0.2 MG/DL (ref 0–0.3)
BILIRUB SERPL-MCNC: 0.2 MG/DL (ref 0.3–1.2)
BUN SERPL-MCNC: 17 MG/DL (ref 7–22)
CALCIUM SERPL-MCNC: 8.8 MG/DL (ref 8.5–10.5)
CHLORIDE SERPL-SCNC: 105 MEQ/L (ref 98–111)
CO2 SERPL-SCNC: 23 MEQ/L (ref 23–33)
CREAT SERPL-MCNC: 0.9 MG/DL (ref 0.4–1.2)
D DIMER PPP IA.FEU-MCNC: 708 NG/ML FEU (ref 0–500)
DEPRECATED RDW RBC AUTO: 48 FL (ref 35–45)
EOSINOPHIL NFR BLD AUTO: 2 %
EOSINOPHILS ABSOLUTE: 0.2 THOU/MM3 (ref 0–0.4)
ERYTHROCYTE [DISTWIDTH] IN BLOOD BY AUTOMATED COUNT: 14.5 % (ref 11.5–14.5)
FLUAV RNA RESP QL NAA+PROBE: NOT DETECTED
FLUBV RNA RESP QL NAA+PROBE: NOT DETECTED
GFR SERPL CREATININE-BSD FRML MDRD: > 60 ML/MIN/1.73M2
GLUCOSE SERPL-MCNC: 93 MG/DL (ref 70–108)
HCT VFR BLD AUTO: 41.4 % (ref 37–47)
HGB BLD-MCNC: 13.6 GM/DL (ref 12–16)
IMM GRANULOCYTES # BLD AUTO: 0.03 THOU/MM3 (ref 0–0.07)
IMM GRANULOCYTES NFR BLD AUTO: 0.3 %
LYMPHOCYTES ABSOLUTE: 1.8 THOU/MM3 (ref 1–4.8)
LYMPHOCYTES NFR BLD AUTO: 18.5 %
MCH RBC QN AUTO: 29.8 PG (ref 26–33)
MCHC RBC AUTO-ENTMCNC: 32.9 GM/DL (ref 32.2–35.5)
MCV RBC AUTO: 90.8 FL (ref 81–99)
MONOCYTES ABSOLUTE: 0.7 THOU/MM3 (ref 0.4–1.3)
MONOCYTES NFR BLD AUTO: 7.4 %
NEUTROPHILS NFR BLD AUTO: 71.4 %
NRBC BLD AUTO-RTO: 0 /100 WBC
NT-PROBNP SERPL IA-MCNC: 338.6 PG/ML (ref 0–124)
OSMOLALITY SERPL CALC.SUM OF ELEC: 276.9 MOSMOL/KG (ref 275–300)
PLATELET # BLD AUTO: 176 THOU/MM3 (ref 130–400)
PMV BLD AUTO: 10.9 FL (ref 9.4–12.4)
POTASSIUM SERPL-SCNC: 4.1 MEQ/L (ref 3.5–5.2)
PROT SERPL-MCNC: 5.9 G/DL (ref 6.1–8)
RBC # BLD AUTO: 4.56 MILL/MM3 (ref 4.2–5.4)
SARS-COV-2 RNA RESP QL NAA+PROBE: NOT DETECTED
SEGMENTED NEUTROPHILS ABSOLUTE COUNT: 6.9 THOU/MM3 (ref 1.8–7.7)
SODIUM SERPL-SCNC: 138 MEQ/L (ref 135–145)
TROPONIN, HIGH SENSITIVITY: 10 NG/L (ref 0–12)
WBC # BLD AUTO: 9.7 THOU/MM3 (ref 4.8–10.8)

## 2023-10-22 PROCEDURE — 83880 ASSAY OF NATRIURETIC PEPTIDE: CPT

## 2023-10-22 PROCEDURE — 71275 CT ANGIOGRAPHY CHEST: CPT

## 2023-10-22 PROCEDURE — 93005 ELECTROCARDIOGRAM TRACING: CPT | Performed by: EMERGENCY MEDICINE

## 2023-10-22 PROCEDURE — 80053 COMPREHEN METABOLIC PANEL: CPT

## 2023-10-22 PROCEDURE — 6360000002 HC RX W HCPCS: Performed by: EMERGENCY MEDICINE

## 2023-10-22 PROCEDURE — 93010 ELECTROCARDIOGRAM REPORT: CPT | Performed by: INTERNAL MEDICINE

## 2023-10-22 PROCEDURE — 84484 ASSAY OF TROPONIN QUANT: CPT

## 2023-10-22 PROCEDURE — 6360000004 HC RX CONTRAST MEDICATION: Performed by: EMERGENCY MEDICINE

## 2023-10-22 PROCEDURE — 85025 COMPLETE CBC W/AUTO DIFF WBC: CPT

## 2023-10-22 PROCEDURE — 2580000003 HC RX 258: Performed by: EMERGENCY MEDICINE

## 2023-10-22 PROCEDURE — 87636 SARSCOV2 & INF A&B AMP PRB: CPT

## 2023-10-22 PROCEDURE — 96374 THER/PROPH/DIAG INJ IV PUSH: CPT

## 2023-10-22 PROCEDURE — 99285 EMERGENCY DEPT VISIT HI MDM: CPT

## 2023-10-22 PROCEDURE — 85379 FIBRIN DEGRADATION QUANT: CPT

## 2023-10-22 PROCEDURE — 96375 TX/PRO/DX INJ NEW DRUG ADDON: CPT

## 2023-10-22 PROCEDURE — 36415 COLL VENOUS BLD VENIPUNCTURE: CPT

## 2023-10-22 PROCEDURE — 71045 X-RAY EXAM CHEST 1 VIEW: CPT

## 2023-10-22 PROCEDURE — 82248 BILIRUBIN DIRECT: CPT

## 2023-10-22 RX ORDER — DIPHENHYDRAMINE HYDROCHLORIDE 50 MG/ML
25 INJECTION INTRAMUSCULAR; INTRAVENOUS ONCE
Status: COMPLETED | OUTPATIENT
Start: 2023-10-22 | End: 2023-10-22

## 2023-10-22 RX ORDER — LORAZEPAM 2 MG/ML
0.5 INJECTION INTRAMUSCULAR ONCE
Status: COMPLETED | OUTPATIENT
Start: 2023-10-22 | End: 2023-10-22

## 2023-10-22 RX ADMIN — WATER 125 MG: 1 INJECTION INTRAMUSCULAR; INTRAVENOUS; SUBCUTANEOUS at 22:50

## 2023-10-22 RX ADMIN — DIPHENHYDRAMINE HYDROCHLORIDE 25 MG: 50 INJECTION INTRAMUSCULAR; INTRAVENOUS at 22:48

## 2023-10-22 RX ADMIN — LORAZEPAM 0.5 MG: 2 INJECTION INTRAMUSCULAR; INTRAVENOUS at 22:15

## 2023-10-22 RX ADMIN — IOPAMIDOL 80 ML: 755 INJECTION, SOLUTION INTRAVENOUS at 22:40

## 2023-10-22 ASSESSMENT — PAIN - FUNCTIONAL ASSESSMENT
PAIN_FUNCTIONAL_ASSESSMENT: 0-10
PAIN_FUNCTIONAL_ASSESSMENT: NONE - DENIES PAIN
PAIN_FUNCTIONAL_ASSESSMENT: 0-10

## 2023-10-22 ASSESSMENT — PAIN DESCRIPTION - LOCATION: LOCATION: CHEST

## 2023-10-22 ASSESSMENT — PAIN SCALES - GENERAL
PAINLEVEL_OUTOF10: 8
PAINLEVEL_OUTOF10: 8
PAINLEVEL_OUTOF10: 6

## 2023-10-22 NOTE — ED NOTES
Patient resting in bed, breathing even and unlabored. Patient provided with a warm blanket at this time. Call light within reach.      Angelica Schwartz RN  10/22/23 2104

## 2023-10-22 NOTE — ED TRIAGE NOTES
Pt presents to the ED for chest pain that started around 1000 today. Pt states that it hurts more when taking a deep breath. Pt states she had a heart cath done yesterday. Pt states she had two blockages but they did not require any stents.

## 2023-10-23 RX ORDER — NITROGLYCERIN 0.4 MG/1
0.4 TABLET SUBLINGUAL EVERY 5 MIN PRN
Qty: 25 TABLET | Refills: 0 | Status: SHIPPED | OUTPATIENT
Start: 2023-10-23

## 2023-10-23 NOTE — DISCHARGE INSTRUCTIONS
Take your medication as indicated. For pain use ibuprofen acetaminophen (Tylenol), unless prescribed medications that have acetaminophen in it. You can take over the counter acetaminophen tablets (1 - 2 tablets of the 500-mg strength every 6 hours) or ibuprofen tablets (2 tablets every 4 hours). If you have not had a stress test in over a year your primary care physician may order this test as further work-up for your chest pain. If you have a cardiologist, then you should also call them to discuss further treatment options. PLEASE RETURN TO THE EMERGENCY DEPARTMENT IMMEDIATELY for worsening symptoms of increasing pain, shortness of breath, feeling of your heart fluttering or racing, swelling to your feet, unable to lay flat, or if you develop any concerning symptoms such as: high fever not relieved by acetaminophen (Tylenol) and/or ibuprofen (Motrin / Advil), chills, persistent nausea and/or vomiting, loss of consciousness, numbness, weakness or tingling in the arms or legs or change in color of the extremities, changes in mental status, persistent headache, blurry vision, loss of bladder / bowel control, unable to follow up with your physician, or other any other care or concern.

## 2023-10-23 NOTE — TELEPHONE ENCOUNTER
Mina Noonan called requesting a refill on the following medications:  Requested Prescriptions     Pending Prescriptions Disp Refills    nitroGLYCERIN (NITROSTAT) 0.4 MG SL tablet 25 tablet 0     Sig: Place 1 tablet under the tongue every 5 minutes as needed for Chest pain     Pharmacy verified: [facility]  . pv      Date of last visit: - recently discharged from [facility]  Date of next visit (if applicable): 03/7/2016

## 2023-10-23 NOTE — ED NOTES
Patient given ice chips at this time. Patient denies any needs at this time. Call light within reach.      Mary Black RN  10/22/23 2032

## 2023-10-25 ENCOUNTER — OFFICE VISIT (OUTPATIENT)
Dept: CARDIOLOGY CLINIC | Age: 63
End: 2023-10-25

## 2023-10-25 VITALS
HEIGHT: 59 IN | DIASTOLIC BLOOD PRESSURE: 78 MMHG | WEIGHT: 101 LBS | HEART RATE: 72 BPM | BODY MASS INDEX: 20.36 KG/M2 | SYSTOLIC BLOOD PRESSURE: 156 MMHG

## 2023-10-25 DIAGNOSIS — R07.9 CHEST PAIN IN ADULT: Primary | ICD-10-CM

## 2023-10-25 DIAGNOSIS — I25.10 CORONARY ARTERY DISEASE INVOLVING NATIVE CORONARY ARTERY OF NATIVE HEART WITHOUT ANGINA PECTORIS: ICD-10-CM

## 2023-10-25 PROCEDURE — 99214 OFFICE O/P EST MOD 30 MIN: CPT | Performed by: STUDENT IN AN ORGANIZED HEALTH CARE EDUCATION/TRAINING PROGRAM

## 2023-10-25 NOTE — PROGRESS NOTES
[facility] PROFESSIONAL SERVICES  [facility] [address]   [address] [facility] [address] [address]   Dept: [phone]   Dept Fax: [phone]: [phone]      Chief Complaint   Patient presents with    Follow-Up from [facility] take a deep breathe it hurts when she tries     Shortness of Breath     On exertion     Palpitations     Cardiologist:  Dr. Charisse Sebastian  59 yo female presents for f/u of [facility] showing non obs CAD. Continued symptoms. Hx of GERD, anxiety, leg pains (michael normal), smoker, suspected sleep apnea, BPPV. Chest pain with deep breaths. Some palpitations, pounding heart and SOB. She felt better on Saturday after cath but Sunday symptoms started with chest/rib pains and now other symptoms have worsened. Has been taking metoprolol, and asa. No other meds. Has history of GERD/GI ulcers. General:   No fever, no chills, no weight loss, + fatigue  Pulmonary:    + dyspnea, no wheezing  Cardiac:    + recent chest pain   GI:     No nausea or vomiting, no abdominal pain  Neuro:     No dizziness or light headedness  Musculoskeletal:  No recent active issues  Extremities:   No edema      Past Medical History:   Diagnosis Date    Allergic rhinitis     seasonal    Angina, class I (HCC)     Anxiety     Asthma     CAD (coronary artery disease)     Cancer (HCC)     cells in colon removed    Cancer (HCC)     cervical    Chronic back pain     Hyperlipidemia     Neuropathy     Parkinson disease ([address])     Peripheral vascular disease ([address])     bad circulation in left leg    Tobacco abuse     Unspecified cerebral artery occlusion with cerebral infarction     mini    Unspecified sleep apnea     patient snores       Allergies   Allergen Reactions    Tylenol With Codeine #3 [Acetaminophen-Codeine] Shortness Of Breath    Flagyl [Metronidazole] Other (See Comments)     Rectal bleeding    Gabapentin      Pt states it made her crazy.  Things moving     Pcn [Penicillins] Hives    Prednisone

## 2023-10-26 LAB
EKG ATRIAL RATE: 70 BPM
EKG P AXIS: 68 DEGREES
EKG P-R INTERVAL: 120 MS
EKG Q-T INTERVAL: 376 MS
EKG QRS DURATION: 80 MS
EKG QTC CALCULATION (BAZETT): 406 MS
EKG R AXIS: 66 DEGREES
EKG T AXIS: 62 DEGREES
EKG VENTRICULAR RATE: 70 BPM

## 2023-11-29 ENCOUNTER — HOSPITAL ENCOUNTER (OUTPATIENT)
Dept: GENERAL RADIOLOGY | Age: 63
Discharge: HOME | End: 2023-11-29

## 2023-11-29 ENCOUNTER — HOSPITAL ENCOUNTER (OUTPATIENT)
Age: 63
Discharge: HOME | End: 2023-11-29

## 2023-11-29 ENCOUNTER — TRANSCRIBE ORDERS (OUTPATIENT)
Dept: ADMINISTRATIVE | Age: 63
End: 2023-11-29

## 2023-11-29 DIAGNOSIS — I73.9 PERIPHERAL VASCULAR DISEASE, UNSPECIFIED (HCC): Primary | ICD-10-CM

## 2023-11-29 DIAGNOSIS — M25.572 LEFT ANKLE PAIN, UNSPECIFIED CHRONICITY: ICD-10-CM

## 2023-11-29 PROCEDURE — 73610 X-RAY EXAM OF ANKLE: CPT

## 2023-11-29 PROCEDURE — 73630 X-RAY EXAM OF FOOT: CPT

## 2023-12-08 ENCOUNTER — HOSPITAL ENCOUNTER (OUTPATIENT)
Dept: INTERVENTIONAL RADIOLOGY/VASCULAR | Age: 63
Discharge: HOME | End: 2023-12-08

## 2023-12-08 DIAGNOSIS — I73.9 PERIPHERAL VASCULAR DISEASE, UNSPECIFIED (HCC): ICD-10-CM

## 2023-12-08 PROCEDURE — 93926 LOWER EXTREMITY STUDY: CPT

## 2025-04-15 ENCOUNTER — HOSPITAL ENCOUNTER (OUTPATIENT)
Age: 65
Discharge: HOME | End: 2025-04-15
Payer: MEDICARE

## 2025-04-15 LAB
ANION GAP SERPL CALC-SCNC: 11 MEQ/L (ref 8–16)
BUN SERPL-MCNC: 17 MG/DL (ref 8–23)
CALCIUM SERPL-MCNC: 9.7 MG/DL (ref 8.8–10.2)
CHLORIDE SERPL-SCNC: 103 MEQ/L (ref 98–111)
CO2 SERPL-SCNC: 23 MEQ/L (ref 22–29)
CREAT SERPL-MCNC: 0.8 MG/DL (ref 0.5–0.9)
GFR SERPL CREATININE-BSD FRML MDRD: 81 ML/MIN/1.73M2
GLUCOSE SERPL-MCNC: 87 MG/DL (ref 74–109)
POTASSIUM SERPL-SCNC: 4.2 MEQ/L (ref 3.5–5.2)
SODIUM SERPL-SCNC: 137 MEQ/L (ref 135–145)

## 2025-04-15 PROCEDURE — 80048 BASIC METABOLIC PNL TOTAL CA: CPT

## 2025-04-15 PROCEDURE — 36415 COLL VENOUS BLD VENIPUNCTURE: CPT
